# Patient Record
Sex: MALE | Race: WHITE | Employment: FULL TIME
[De-identification: names, ages, dates, MRNs, and addresses within clinical notes are randomized per-mention and may not be internally consistent; named-entity substitution may affect disease eponyms.]

---

## 2017-03-03 ENCOUNTER — OFFICE VISIT (OUTPATIENT)
Dept: FAMILY MEDICINE CLINIC | Facility: CLINIC | Age: 57
End: 2017-03-03

## 2017-03-03 VITALS
HEIGHT: 72 IN | SYSTOLIC BLOOD PRESSURE: 152 MMHG | DIASTOLIC BLOOD PRESSURE: 80 MMHG | BODY MASS INDEX: 26.55 KG/M2 | OXYGEN SATURATION: 97 % | WEIGHT: 196 LBS | HEART RATE: 95 BPM

## 2017-03-03 DIAGNOSIS — Z12.5 PROSTATE CANCER SCREENING: ICD-10-CM

## 2017-03-03 DIAGNOSIS — Z00.00 WELL ADULT EXAM: ICD-10-CM

## 2017-03-03 DIAGNOSIS — I10 ESSENTIAL HYPERTENSION: Primary | ICD-10-CM

## 2017-03-03 DIAGNOSIS — R73.9 HYPERGLYCEMIA: ICD-10-CM

## 2017-03-03 PROCEDURE — 99213 OFFICE O/P EST LOW 20 MIN: CPT | Performed by: FAMILY MEDICINE

## 2017-03-03 PROCEDURE — G8422 PT INELIG BMI CALCULATION: HCPCS | Performed by: FAMILY MEDICINE

## 2017-03-03 RX ORDER — LISINOPRIL 40 MG/1
TABLET ORAL
Qty: 90 TABLET | Refills: 3 | Status: SHIPPED | OUTPATIENT
Start: 2017-03-03 | End: 2018-04-09 | Stop reason: SDUPTHER

## 2017-03-03 ASSESSMENT — PATIENT HEALTH QUESTIONNAIRE - PHQ9
1. LITTLE INTEREST OR PLEASURE IN DOING THINGS: 0
SUM OF ALL RESPONSES TO PHQ QUESTIONS 1-9: 0
2. FEELING DOWN, DEPRESSED OR HOPELESS: 0
SUM OF ALL RESPONSES TO PHQ9 QUESTIONS 1 & 2: 0

## 2017-03-03 ASSESSMENT — ENCOUNTER SYMPTOMS
CHEST TIGHTNESS: 0
SHORTNESS OF BREATH: 0
BLOOD IN STOOL: 0
EYE PAIN: 0
ABDOMINAL DISTENTION: 0
TROUBLE SWALLOWING: 0

## 2017-07-27 ENCOUNTER — OFFICE VISIT (OUTPATIENT)
Dept: FAMILY MEDICINE CLINIC | Age: 57
End: 2017-07-27
Payer: COMMERCIAL

## 2017-07-27 VITALS
OXYGEN SATURATION: 94 % | SYSTOLIC BLOOD PRESSURE: 122 MMHG | HEART RATE: 70 BPM | BODY MASS INDEX: 26.55 KG/M2 | HEIGHT: 72 IN | WEIGHT: 195.99 LBS | DIASTOLIC BLOOD PRESSURE: 76 MMHG

## 2017-07-27 DIAGNOSIS — Z12.5 SCREENING FOR PROSTATE CANCER: ICD-10-CM

## 2017-07-27 DIAGNOSIS — Z71.6 TOBACCO ABUSE COUNSELING: ICD-10-CM

## 2017-07-27 DIAGNOSIS — R51.9 NONINTRACTABLE HEADACHE, UNSPECIFIED CHRONICITY PATTERN, UNSPECIFIED HEADACHE TYPE: Primary | ICD-10-CM

## 2017-07-27 DIAGNOSIS — I10 ESSENTIAL HYPERTENSION: ICD-10-CM

## 2017-07-27 PROCEDURE — 99406 BEHAV CHNG SMOKING 3-10 MIN: CPT | Performed by: PHYSICIAN ASSISTANT

## 2017-07-27 PROCEDURE — 99213 OFFICE O/P EST LOW 20 MIN: CPT | Performed by: PHYSICIAN ASSISTANT

## 2017-07-27 ASSESSMENT — ENCOUNTER SYMPTOMS
BLURRED VISION: 0
SHORTNESS OF BREATH: 0
DIARRHEA: 0
WHEEZING: 0
COLOR CHANGE: 0
ABDOMINAL PAIN: 0
CONSTIPATION: 0
NAUSEA: 0
VOMITING: 1
COUGH: 0

## 2017-07-31 LAB
ALBUMIN SERPL-MCNC: 3.9 G/DL
ALP BLD-CCNC: 60 U/L
ALT SERPL-CCNC: 13 U/L
ANION GAP SERPL CALCULATED.3IONS-SCNC: NORMAL MMOL/L
AST SERPL-CCNC: 16 U/L
BASOPHILS ABSOLUTE: NORMAL /ΜL
BASOPHILS RELATIVE PERCENT: NORMAL %
BILIRUB SERPL-MCNC: 0.3 MG/DL (ref 0.1–1.4)
BUN BLDV-MCNC: 6 MG/DL
CALCIUM SERPL-MCNC: 9.2 MG/DL
CHLORIDE BLD-SCNC: 101 MMOL/L
CHOLESTEROL, TOTAL: 150 MG/DL
CHOLESTEROL/HDL RATIO: 4.2
CO2: 28 MMOL/L
CREAT SERPL-MCNC: 0.76 MG/DL
EOSINOPHILS ABSOLUTE: NORMAL /ΜL
EOSINOPHILS RELATIVE PERCENT: NORMAL %
GFR CALCULATED: NORMAL
GLUCOSE BLD-MCNC: 94 MG/DL
HCT VFR BLD CALC: NORMAL % (ref 41–53)
HDLC SERPL-MCNC: 36 MG/DL (ref 35–70)
HEMOGLOBIN: NORMAL G/DL (ref 13.5–17.5)
LDL CHOLESTEROL CALCULATED: 97 MG/DL (ref 0–160)
LYMPHOCYTES ABSOLUTE: NORMAL /ΜL
LYMPHOCYTES RELATIVE PERCENT: NORMAL %
MCH RBC QN AUTO: NORMAL PG
MCHC RBC AUTO-ENTMCNC: NORMAL G/DL
MCV RBC AUTO: NORMAL FL
MONOCYTES ABSOLUTE: NORMAL /ΜL
MONOCYTES RELATIVE PERCENT: NORMAL %
NEUTROPHILS ABSOLUTE: NORMAL /ΜL
NEUTROPHILS RELATIVE PERCENT: NORMAL %
PDW BLD-RTO: NORMAL %
PLATELET # BLD: NORMAL K/ΜL
PMV BLD AUTO: NORMAL FL
POTASSIUM SERPL-SCNC: 4.8 MMOL/L
PROSTATE SPECIFIC ANTIGEN: 2.83 NG/ML
RBC # BLD: NORMAL 10^6/ΜL
SODIUM BLD-SCNC: 137 MMOL/L
TOTAL PROTEIN: 7.2
TRIGL SERPL-MCNC: 84 MG/DL
TSH SERPL DL<=0.05 MIU/L-ACNC: 1.88 UIU/ML
VLDLC SERPL CALC-MCNC: 17 MG/DL
WBC # BLD: NORMAL 10^3/ML

## 2017-08-01 DIAGNOSIS — I10 ESSENTIAL HYPERTENSION: ICD-10-CM

## 2017-08-01 DIAGNOSIS — Z12.5 SCREENING FOR PROSTATE CANCER: ICD-10-CM

## 2018-04-09 DIAGNOSIS — Z00.00 WELL ADULT EXAM: ICD-10-CM

## 2018-04-09 RX ORDER — LISINOPRIL 40 MG/1
TABLET ORAL
Qty: 90 TABLET | Refills: 1 | Status: SHIPPED | OUTPATIENT
Start: 2018-04-09 | End: 2018-10-31

## 2018-04-09 RX ORDER — LISINOPRIL 40 MG/1
40 TABLET ORAL DAILY
Qty: 30 TABLET | Refills: 0 | Status: SHIPPED | OUTPATIENT
Start: 2018-04-09 | End: 2018-10-31 | Stop reason: SDUPTHER

## 2018-10-07 RX ORDER — LISINOPRIL 40 MG/1
40 TABLET ORAL DAILY
Qty: 30 TABLET | Refills: 0 | OUTPATIENT
Start: 2018-10-07

## 2018-10-31 ENCOUNTER — OFFICE VISIT (OUTPATIENT)
Dept: FAMILY MEDICINE CLINIC | Age: 58
End: 2018-10-31
Payer: COMMERCIAL

## 2018-10-31 VITALS
DIASTOLIC BLOOD PRESSURE: 90 MMHG | BODY MASS INDEX: 25.71 KG/M2 | OXYGEN SATURATION: 95 % | HEART RATE: 103 BPM | SYSTOLIC BLOOD PRESSURE: 152 MMHG | HEIGHT: 72 IN | WEIGHT: 189.8 LBS | TEMPERATURE: 98.2 F

## 2018-10-31 DIAGNOSIS — I10 ESSENTIAL HYPERTENSION: Primary | ICD-10-CM

## 2018-10-31 DIAGNOSIS — Z12.5 PROSTATE CANCER SCREENING: ICD-10-CM

## 2018-10-31 PROCEDURE — 99213 OFFICE O/P EST LOW 20 MIN: CPT | Performed by: PHYSICIAN ASSISTANT

## 2018-10-31 RX ORDER — LISINOPRIL 40 MG/1
40 TABLET ORAL DAILY
Qty: 90 TABLET | Refills: 3 | Status: SHIPPED | OUTPATIENT
Start: 2018-10-31 | End: 2019-02-11 | Stop reason: SDUPTHER

## 2018-10-31 ASSESSMENT — PATIENT HEALTH QUESTIONNAIRE - PHQ9
SUM OF ALL RESPONSES TO PHQ QUESTIONS 1-9: 0
SUM OF ALL RESPONSES TO PHQ QUESTIONS 1-9: 0
1. LITTLE INTEREST OR PLEASURE IN DOING THINGS: 0
SUM OF ALL RESPONSES TO PHQ9 QUESTIONS 1 & 2: 0
2. FEELING DOWN, DEPRESSED OR HOPELESS: 0

## 2018-10-31 ASSESSMENT — ENCOUNTER SYMPTOMS
CONSTIPATION: 0
COLOR CHANGE: 0
DIARRHEA: 0
BLURRED VISION: 0
ABDOMINAL PAIN: 0
VOMITING: 0
SHORTNESS OF BREATH: 0
COUGH: 0
WHEEZING: 0
NAUSEA: 0

## 2018-10-31 NOTE — PROGRESS NOTES
Used    Alcohol use 0.0 - 6.0 oz/week      Current Outpatient Prescriptions   Medication Sig Dispense Refill    lisinopril (PRINIVIL;ZESTRIL) 40 MG tablet Take 1 tablet by mouth daily 90 tablet 3     No current facility-administered medications for this visit. Allergies   Allergen Reactions    Penicillins Rash       Health Maintenance   Topic Date Due    Hepatitis C screen  1960    HIV screen  02/28/1976    Colon cancer screen colonoscopy  02/28/2011    Potassium monitoring  07/31/2018    Creatinine monitoring  07/31/2018    Shingles Vaccine (1 of 2 - 2 Dose Series) 11/07/2019 (Originally 2/28/2011)    Flu vaccine (1) 11/13/2019 (Originally 9/1/2018)    DTaP/Tdap/Td vaccine (1 - Tdap) 11/21/2019 (Originally 2/28/1980)    Lipid screen  07/31/2022       Subjective:     Review of Systems   Constitutional: Negative for activity change, appetite change, fatigue, fever, malaise/fatigue and unexpected weight change. BP (!) 152/90 (Site: Right Upper Arm, Position: Sitting, Cuff Size: Medium Adult)   Pulse 103   Temp 98.2 °F (36.8 °C) (Tympanic)   Ht 6' (1.829 m)   Wt 189 lb 12.8 oz (86.1 kg)   SpO2 95%   BMI 25.74 kg/m²    Eyes: Negative for blurred vision. Respiratory: Negative for cough, shortness of breath and wheezing. Cardiovascular: Negative for chest pain, palpitations and leg swelling. Gastrointestinal: Negative for abdominal pain, constipation, diarrhea, nausea and vomiting. Genitourinary: Negative for dysuria. Skin: Negative for color change, pallor, rash and wound. Neurological: Negative for weakness and headaches. Hematological: Negative for adenopathy. Psychiatric/Behavioral: Negative for sleep disturbance. The patient is not nervous/anxious. Objective:     Physical Exam   Constitutional: He is oriented to person, place, and time. He appears well-developed and well-nourished. HENT:   Head: Normocephalic and atraumatic.    Cardiovascular: Normal rate,

## 2019-02-11 DIAGNOSIS — I10 ESSENTIAL HYPERTENSION: ICD-10-CM

## 2019-02-11 RX ORDER — LISINOPRIL 40 MG/1
40 TABLET ORAL DAILY
Qty: 30 TABLET | Refills: 0 | Status: SHIPPED | OUTPATIENT
Start: 2019-02-11 | End: 2019-04-11 | Stop reason: SDUPTHER

## 2019-04-11 DIAGNOSIS — I10 ESSENTIAL HYPERTENSION: ICD-10-CM

## 2019-04-11 RX ORDER — LISINOPRIL 40 MG/1
40 TABLET ORAL DAILY
Qty: 30 TABLET | Refills: 3 | Status: SHIPPED | OUTPATIENT
Start: 2019-04-11 | End: 2019-08-19 | Stop reason: SDUPTHER

## 2019-04-17 ENCOUNTER — OFFICE VISIT (OUTPATIENT)
Dept: FAMILY MEDICINE CLINIC | Age: 59
End: 2019-04-17
Payer: COMMERCIAL

## 2019-04-17 VITALS
WEIGHT: 182 LBS | SYSTOLIC BLOOD PRESSURE: 160 MMHG | HEIGHT: 72 IN | OXYGEN SATURATION: 98 % | DIASTOLIC BLOOD PRESSURE: 82 MMHG | HEART RATE: 76 BPM | BODY MASS INDEX: 24.65 KG/M2

## 2019-04-17 DIAGNOSIS — I10 ESSENTIAL HYPERTENSION: Primary | ICD-10-CM

## 2019-04-17 PROCEDURE — 99213 OFFICE O/P EST LOW 20 MIN: CPT | Performed by: PHYSICIAN ASSISTANT

## 2019-04-17 RX ORDER — AMLODIPINE BESYLATE 5 MG/1
5 TABLET ORAL DAILY
Qty: 30 TABLET | Refills: 5 | Status: SHIPPED | OUTPATIENT
Start: 2019-04-17 | End: 2019-04-30 | Stop reason: DRUGHIGH

## 2019-04-17 ASSESSMENT — ENCOUNTER SYMPTOMS
COUGH: 0
VOMITING: 0
WHEEZING: 0
DIARRHEA: 0
ABDOMINAL PAIN: 0
COLOR CHANGE: 0
NAUSEA: 0
CONSTIPATION: 0
BLURRED VISION: 0
SHORTNESS OF BREATH: 0

## 2019-04-17 ASSESSMENT — PATIENT HEALTH QUESTIONNAIRE - PHQ9
SUM OF ALL RESPONSES TO PHQ9 QUESTIONS 1 & 2: 0
2. FEELING DOWN, DEPRESSED OR HOPELESS: 0
1. LITTLE INTEREST OR PLEASURE IN DOING THINGS: 0
SUM OF ALL RESPONSES TO PHQ QUESTIONS 1-9: 0
SUM OF ALL RESPONSES TO PHQ QUESTIONS 1-9: 0

## 2019-04-17 NOTE — PROGRESS NOTES
Visit Information    Have you changed or started any medications since your last visit including any over-the-counter medicines, vitamins, or herbal medicines? no   Have you stopped taking any of your medications? Is so, why? -  no  Are you having any side effects from any of your medications? - no    Have you seen any other physician or provider since your last visit?  no   Have you had any other diagnostic tests since your last visit?  no   Have you been seen in the emergency room and/or had an admission in a hospital since we last saw you?  no   Have you had your routine dental cleaning in the past 6 months?  no     Do you have an active MyChart account? If no, what is the barrier?   Yes    Patient Care Team:  Vic Cameron PA-C as PCP - General (Family Medicine)    Medical History Review  Past Medical, Family, and Social History reviewed and does contribute to the patient presenting condition    Health Maintenance   Topic Date Due    Hepatitis C screen  1960    HIV screen  02/28/1976    Colon cancer screen colonoscopy  02/28/2011    Potassium monitoring  07/31/2018    Creatinine monitoring  07/31/2018    Shingles Vaccine (1 of 2) 11/07/2019 (Originally 2/28/2011)    Flu vaccine (Season Ended) 11/13/2019 (Originally 9/1/2019)    DTaP/Tdap/Td vaccine (1 - Tdap) 11/21/2019 (Originally 2/28/1980)    Lipid screen  07/31/2022    Pneumococcal 0-64 years Vaccine  Aged Out

## 2019-04-17 NOTE — PROGRESS NOTES
[address] [facility] [address]  Dept: [phone]  Dept Fax: [phone]     Alex Hogan is a 63 y.o. male who presents today for his medical conditions/complaintsas noted below. Alex Hogan is c/o of   Chief Complaint   Patient presents with    Hypertension     Patient is here for follow up on HTN         HPI:      Hypertension   This is a chronic problem. The current episode started more than 1 year ago. The problem has been gradually worsening since onset. The problem is uncontrolled. Pertinent negatives include no anxiety, blurred vision, chest pain, headaches, malaise/fatigue, palpitations, peripheral edema or shortness of breath. Risk factors for coronary artery disease include family history and male gender. Past treatments include ACE inhibitors. The current treatment provides mild improvement. patient here reporting his BP has been running high recently and is no longer allowed to work until controlled. 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 He states BP was elevated there  He works in [employer] at [address] [place] [address] not gotten his labs done yet  Patient does drink 2 cups coffee in the morning. He is smoking 1 ppd, he is looking into accupuncture.  Has apt upcoming    No results found for: LABA1C          ( goal A1Cis < 7)   No results found for: LABMICR  LDL Cholesterol (mg/dL)   Date Value   02/13/2015 136 (H)     LDL Calculated (mg/dL)   Date Value   07/31/2017 97       (goal LDL is <100)   AST (U/L)   Date Value   07/31/2017 16     ALT (U/L)   Date Value   07/31/2017 13     BUN (mg/dL)   Date Value   07/31/2017 6     BP Readings from Last 3 Encounters:   04/17/19 (!) 160/82   10/31/18 (!) 152/90   07/27/17 122/76          (goal 120/80)    Past Medical History:   Diagnosis Date    Essential hypertension 1/22/2016      Past Surgical History:   Procedure Laterality Date  COLONOSCOPY         Family History   Problem Relation Age of Onset   Ivette Pollack Cancer Mother         pancreatic    Cancer Father         colon    Cancer Paternal Uncle         colon          Social History     Tobacco Use    Smoking status: Current Every Day Smoker     Packs/day: 1.50     Types: Cigarettes     Last attempt to quit: 2015     Years since quittin.1    Smokeless tobacco: Never Used   Substance Use Topics    Alcohol use: Yes     Alcohol/week: 0.0 - 6.0 oz         Current Outpatient Medications   Medication Sig Dispense Refill    amLODIPine (NORVASC) 5 MG tablet Take 1 tablet by mouth daily 30 tablet 5    lisinopril (PRINIVIL;ZESTRIL) 40 MG tablet Take 1 tablet by mouth daily 30 tablet 3     No current facility-administered medications for this visit. Allergies   Allergen Reactions    Penicillins Rash       Health Maintenance   Topic Date Due    Hepatitis C screen  1961    HIV screen  1976    Colon cancer screen colonoscopy  2011    Potassium monitoring  2018    Creatinine monitoring  2018    Shingles Vaccine (1 of 2) 2019 (Originally 2011)    Flu vaccine (Season Ended) 2019 (Originally 2019)    DTaP/Tdap/Td vaccine (1 - Tdap) 2019 (Originally 1980)    Lipid screen  2022    Pneumococcal 0-64 years Vaccine  Aged Out       Subjective:     Review of Systems   Constitutional: Negative for activity change, appetite change, fatigue, fever, malaise/fatigue and unexpected weight change. BP (!) 160/82 (Site: Left Upper Arm, Position: Sitting, Cuff Size: Medium Adult)   Pulse 76   Ht 6' (1.829 m)   Wt 182 lb (82.6 kg)   SpO2 98%   BMI 24.68 kg/m²    Eyes: Negative for blurred vision. Respiratory: Negative for cough, shortness of breath and wheezing. Cardiovascular: Negative for chest pain, palpitations and leg swelling.    Gastrointestinal: Negative for abdominal pain, constipation, diarrhea, nausea and (NORVASC) 5 MG tablet     Sig: Take 1 tablet by mouth daily     Dispense:  30 tablet     Refill:  5       Patient given educational materials - see patient instructions. Discussed use, benefit, and side effects of prescribed medications. All patientquestions answered. Pt voiced understanding. Reviewed health maintenance. Instructedto continue current medications, diet and exercise. Patient agreed with treatmentplan. Follow up as directed.      Electronically signed by Lady Olszewski, PA-C on 4/17/2019 at 2:50 PM

## 2019-04-18 ENCOUNTER — HOSPITAL ENCOUNTER (OUTPATIENT)
Age: 59
Setting detail: SPECIMEN
Discharge: HOME | End: 2019-04-18
Payer: COMMERCIAL

## 2019-04-18 DIAGNOSIS — Z12.5 PROSTATE CANCER SCREENING: ICD-10-CM

## 2019-04-18 DIAGNOSIS — I10 ESSENTIAL HYPERTENSION: ICD-10-CM

## 2019-04-18 LAB
ABSOLUTE EOS #: 0.1 K/UL (ref 0–0.44)
ABSOLUTE IMMATURE GRANULOCYTE: 0.03 K/UL (ref 0–0.3)
ABSOLUTE LYMPH #: 2.75 K/UL (ref 1.1–3.7)
ABSOLUTE MONO #: 0.87 K/UL (ref 0.1–1.2)
ALBUMIN SERPL-MCNC: 4.3 G/DL (ref 3.5–5.2)
ALBUMIN/GLOBULIN RATIO: 1.4 (ref 1–2.5)
ALP BLD-CCNC: 68 U/L (ref 40–129)
ALT SERPL-CCNC: 18 U/L (ref 5–41)
ANION GAP SERPL CALCULATED.3IONS-SCNC: 12 MMOL/L (ref 9–17)
AST SERPL-CCNC: 26 U/L
BASOPHILS # BLD: 1 % (ref 0–2)
BASOPHILS ABSOLUTE: 0.07 K/UL (ref 0–0.2)
BILIRUB SERPL-MCNC: 0.49 MG/DL (ref 0.3–1.2)
BUN BLDV-MCNC: 8 MG/DL (ref 6–20)
BUN/CREAT BLD: ABNORMAL (ref 9–20)
CALCIUM SERPL-MCNC: 9.3 MG/DL (ref 8.6–10.4)
CHLORIDE BLD-SCNC: 102 MMOL/L (ref 98–107)
CHOLESTEROL/HDL RATIO: 3
CHOLESTEROL: 214 MG/DL
CO2: 25 MMOL/L (ref 20–31)
CREAT SERPL-MCNC: 0.68 MG/DL (ref 0.7–1.2)
DIFFERENTIAL TYPE: NORMAL
EOSINOPHILS RELATIVE PERCENT: 1 % (ref 1–4)
GFR AFRICAN AMERICAN: >60 ML/MIN
GFR NON-AFRICAN AMERICAN: >60 ML/MIN
GFR SERPL CREATININE-BSD FRML MDRD: ABNORMAL ML/MIN/{1.73_M2}
GFR SERPL CREATININE-BSD FRML MDRD: ABNORMAL ML/MIN/{1.73_M2}
GLUCOSE BLD-MCNC: 107 MG/DL (ref 70–99)
HCT VFR BLD CALC: 49.2 % (ref 40.7–50.3)
HDLC SERPL-MCNC: 72 MG/DL
HEMOGLOBIN: 16.6 G/DL (ref 13–17)
IMMATURE GRANULOCYTES: 0 %
LDL CHOLESTEROL: 133 MG/DL (ref 0–130)
LYMPHOCYTES # BLD: 35 % (ref 24–43)
MCH RBC QN AUTO: 32.7 PG (ref 25.2–33.5)
MCHC RBC AUTO-ENTMCNC: 33.7 G/DL (ref 28.4–34.8)
MCV RBC AUTO: 96.9 FL (ref 82.6–102.9)
MONOCYTES # BLD: 11 % (ref 3–12)
NRBC AUTOMATED: 0 PER 100 WBC
PDW BLD-RTO: 13.9 % (ref 11.8–14.4)
PLATELET # BLD: 353 K/UL (ref 138–453)
PLATELET ESTIMATE: NORMAL
PMV BLD AUTO: 11 FL (ref 8.1–13.5)
POTASSIUM SERPL-SCNC: 5.9 MMOL/L (ref 3.7–5.3)
PROSTATE SPECIFIC ANTIGEN: 2.43 UG/L
RBC # BLD: 5.08 M/UL (ref 4.21–5.77)
RBC # BLD: NORMAL 10*6/UL
SEG NEUTROPHILS: 52 % (ref 36–65)
SEGMENTED NEUTROPHILS ABSOLUTE COUNT: 4.09 K/UL (ref 1.5–8.1)
SODIUM BLD-SCNC: 139 MMOL/L (ref 135–144)
TOTAL PROTEIN: 7.3 G/DL (ref 6.4–8.3)
TRIGL SERPL-MCNC: 45 MG/DL
VLDLC SERPL CALC-MCNC: ABNORMAL MG/DL (ref 1–30)
WBC # BLD: 7.9 K/UL (ref 3.5–11.3)
WBC # BLD: NORMAL 10*3/UL

## 2019-04-24 ENCOUNTER — OFFICE VISIT (OUTPATIENT)
Dept: FAMILY MEDICINE CLINIC | Age: 59
End: 2019-04-24
Payer: COMMERCIAL

## 2019-04-24 VITALS
SYSTOLIC BLOOD PRESSURE: 136 MMHG | BODY MASS INDEX: 24.82 KG/M2 | DIASTOLIC BLOOD PRESSURE: 84 MMHG | WEIGHT: 183 LBS | OXYGEN SATURATION: 98 % | HEART RATE: 100 BPM

## 2019-04-24 DIAGNOSIS — E78.5 DYSLIPIDEMIA: ICD-10-CM

## 2019-04-24 DIAGNOSIS — I10 ESSENTIAL HYPERTENSION: Primary | ICD-10-CM

## 2019-04-24 DIAGNOSIS — E87.5 HYPERKALEMIA: ICD-10-CM

## 2019-04-24 PROCEDURE — 99213 OFFICE O/P EST LOW 20 MIN: CPT | Performed by: PHYSICIAN ASSISTANT

## 2019-04-24 RX ORDER — ATORVASTATIN CALCIUM 20 MG/1
20 TABLET, FILM COATED ORAL DAILY
Qty: 30 TABLET | Refills: 3 | Status: SHIPPED | OUTPATIENT
Start: 2019-04-24 | End: 2021-01-12

## 2019-04-24 ASSESSMENT — ENCOUNTER SYMPTOMS
SHORTNESS OF BREATH: 0
CONSTIPATION: 0
NAUSEA: 0
ABDOMINAL PAIN: 0
COUGH: 0
DIARRHEA: 0
WHEEZING: 0
BLURRED VISION: 0
COLOR CHANGE: 0
VOMITING: 0

## 2019-04-24 NOTE — PROGRESS NOTES
[address] [facility] [address]  Dept: [phone]  Dept Fax: [phone]     Aubry Halsted is a 63 y.o. male who presents today for his medical conditions/complaintsas noted below. Aubry Halsted is c/o of   Chief Complaint   Patient presents with    Hypertension     Patient is here for follow up on HTN         HPI:      Hypertension   This is a chronic problem. The current episode started more than 1 year ago. The problem has been gradually improving since onset. The problem is controlled. Pertinent negatives include no anxiety, blurred vision, chest pain, headaches, malaise/fatigue, palpitations, peripheral edema or shortness of breath. Risk factors for coronary artery disease include male gender, family history and smoking/tobacco exposure. Past treatments include ACE inhibitors and calcium channel blockers. The current treatment provides moderate improvement.    patient here for recheck on BP  Numbers improving, feeling well    No results found for: LABA1C          ( goal A1Cis < 7)   No results found for: LABMICR  LDL Cholesterol (mg/dL)   Date Value   04/18/2019 133 (H)   02/13/2015 136 (H)     LDL Calculated (mg/dL)   Date Value   07/31/2017 97       (goal LDL is <100)   AST (U/L)   Date Value   04/18/2019 26     ALT (U/L)   Date Value   04/18/2019 18     BUN (mg/dL)   Date Value   04/18/2019 8     BP Readings from Last 3 Encounters:   04/24/19 136/84   04/17/19 (!) 160/82   10/31/18 (!) 152/90          (goal 120/80)    Past Medical History:   Diagnosis Date    Essential hypertension 1/22/2016      Past Surgical History:   Procedure Laterality Date    COLONOSCOPY         Family History   Problem Relation Age of Onset    Cancer Mother         pancreatic    Cancer Father         colon    Cancer Paternal Uncle         colon          Social History     Tobacco Use    Smoking status: Current Every Day Smoker Packs/day: 1.50     Types: Cigarettes     Last attempt to quit: 2015     Years since quittin.1    Smokeless tobacco: Never Used   Substance Use Topics    Alcohol use: Yes     Alcohol/week: 0.0 - 6.0 oz         Current Outpatient Medications   Medication Sig Dispense Refill    atorvastatin (LIPITOR) 20 MG tablet Take 1 tablet by mouth daily 30 tablet 3    amLODIPine (NORVASC) 5 MG tablet Take 1 tablet by mouth daily 30 tablet 5    lisinopril (PRINIVIL;ZESTRIL) 40 MG tablet Take 1 tablet by mouth daily 30 tablet 3     No current facility-administered medications for this visit. Allergies   Allergen Reactions    Penicillins Rash       Health Maintenance   Topic Date Due    Hepatitis C screen  1961    Pneumococcal 0-64 years Vaccine (1 of 1 - PPSV23) 1967    HIV screen  1976    Colon cancer screen colonoscopy  2011    Shingles Vaccine (1 of 2) 2019 (Originally 2011)    Flu vaccine (Season Ended) 2019 (Originally 2019)    DTaP/Tdap/Td vaccine (1 - Tdap) 2019 (Originally 1980)    Potassium monitoring  2020    Creatinine monitoring  2020    Lipid screen  2024       Subjective:     Review of Systems   Constitutional: Negative for activity change, appetite change, fatigue, fever, malaise/fatigue and unexpected weight change. /84 (Site: Right Upper Arm, Position: Sitting, Cuff Size: Medium Adult)   Pulse 100   Wt 183 lb (83 kg)   SpO2 98%   BMI 24.82 kg/m²    Eyes: Negative for blurred vision. Respiratory: Negative for cough, shortness of breath and wheezing. Cardiovascular: Negative for chest pain, palpitations and leg swelling. Gastrointestinal: Negative for abdominal pain, constipation, diarrhea, nausea and vomiting. Skin: Negative for color change, pallor, rash and wound. Neurological: Negative for weakness and headaches. Hematological: Negative for adenopathy.    Psychiatric/Behavioral: Negative for sleep disturbance. The patient is not nervous/anxious. Objective:     Physical Exam   Constitutional: He appears well-developed and well-nourished. HENT:   Head: Normocephalic and atraumatic. Cardiovascular: Normal rate, regular rhythm and normal heart sounds. No murmur heard. Pulmonary/Chest: Effort normal and breath sounds normal. No respiratory distress. He has no wheezes. He has no rales. Skin: Skin is warm and dry. No rash noted. No erythema. No pallor. Psychiatric: He has a normal mood and affect. His behavior is normal. Judgment and thought content normal.   Nursing note and vitals reviewed. /84 (Site: Right Upper Arm, Position: Sitting, Cuff Size: Medium Adult)   Pulse 100   Wt 183 lb (83 kg)   SpO2 98%   BMI 24.82 kg/m²     Assessment:          Diagnosis Orders   1. Essential hypertension     2. Dyslipidemia  atorvastatin (LIPITOR) 20 MG tablet   3. Hyperkalemia  Basic Metabolic Panel             Plan:      Return in about 1 week (around 5/1/2019).     Planning to RTW 5/1/19  Will rehcekc on 4/30 prior  Add statin due to ASCVD risk  Cont to work on smoking cessation  Patient BP improved  Patient agreed with treatment plan     Lab Results   Component Value Date    WBC 7.9 04/18/2019    HGB 16.6 04/18/2019    HCT 49.2 04/18/2019    MCV 96.9 04/18/2019     04/18/2019     Lab Results   Component Value Date     04/18/2019    K 5.9 (H) 04/18/2019     04/18/2019    CO2 25 04/18/2019    BUN 8 04/18/2019    CREATININE 0.68 (L) 04/18/2019    GLUCOSE 107 (H) 04/18/2019    CALCIUM 9.3 04/18/2019    PROT 7.3 04/18/2019    LABALBU 4.3 04/18/2019    BILITOT 0.49 04/18/2019    ALKPHOS 68 04/18/2019    AST 26 04/18/2019    ALT 18 04/18/2019    LABGLOM >60 04/18/2019    GFRAA >60 04/18/2019       Lab Results   Component Value Date    CHOL 214 (H) 04/18/2019    CHOL 150 07/31/2017    CHOL 217 (H) 02/13/2015     Lab Results   Component Value Date    TRIG 45 04/18/2019    TRIG 84 07/31/2017    TRIG 79 02/13/2015     Lab Results   Component Value Date    HDL 72 04/18/2019    HDL 36 07/31/2017    HDL 65 02/13/2015     Lab Results   Component Value Date    LDLCHOLESTEROL 133 (H) 04/18/2019    LDLCHOLESTEROL 136 (H) 02/13/2015    LDLCALC 97 07/31/2017     Lab Results   Component Value Date    VLDL NOT REPORTED 04/18/2019    VLDL 17 07/31/2017    VLDL NOT REPORTED 02/13/2015     Lab Results   Component Value Date    CHOLHDLRATIO 3.0 04/18/2019    CHOLHDLRATIO 4.2 07/31/2017    CHOLHDLRATIO 3.3 02/13/2015     No results found for: LABA1C  No results found for: EAG    Orders Placed This Encounter   Medications    atorvastatin (LIPITOR) 20 MG tablet     Sig: Take 1 tablet by mouth daily     Dispense:  30 tablet     Refill:  3    The 10-year ASCVD risk score (Deepika Allan, et al., 2013) is: 12.8%    Values used to calculate the score:      Age: 63 years      Sex: Male      Is Non- : No      Diabetic: No      Tobacco smoker: Yes      Systolic Blood Pressure: 146 mmHg      Is BP treated: Yes      HDL Cholesterol: 72 mg/dL      Total Cholesterol: 214 mg/dL     Patient given educational materials - see patient instructions. Discussed use, benefit, and side effects of prescribed medications. All patientquestions answered. Pt voiced understanding. Reviewed health maintenance. Instructedto continue current medications, diet and exercise. Patient agreed with treatmentplan. Follow up as directed.      Electronically signed by Mauri Chris PA-C on 4/24/2019 at 3:22 PM

## 2019-04-24 NOTE — PROGRESS NOTES
Visit Information    Have you changed or started any medications since your last visit including any over-the-counter medicines, vitamins, or herbal medicines? no   Have you stopped taking any of your medications? Is so, why? -  no  Are you having any side effects from any of your medications? - no    Have you seen any other physician or provider since your last visit?  no   Have you had any other diagnostic tests since your last visit?  no   Have you been seen in the emergency room and/or had an admission in a hospital since we last saw you?  no   Have you had your routine dental cleaning in the past 6 months?  no     Do you have an active MyChart account? If no, what is the barrier?   Yes    Patient Care Team:  Galo Dexter PA-C as PCP - General (Family Medicine)    Medical History Review  Past Medical, Family, and Social History reviewed and does contribute to the patient presenting condition    Health Maintenance   Topic Date Due    Hepatitis C screen  1960    Pneumococcal 0-64 years Vaccine (1 of 1 - PPSV23) 02/28/1967    HIV screen  02/28/1976    Colon cancer screen colonoscopy  02/28/2011    Shingles Vaccine (1 of 2) 11/07/2019 (Originally 2/28/2011)    Flu vaccine (Season Ended) 11/13/2019 (Originally 9/1/2019)    DTaP/Tdap/Td vaccine (1 - Tdap) 11/21/2019 (Originally 2/28/1980)    Potassium monitoring  04/18/2020    Creatinine monitoring  04/18/2020    Lipid screen  04/18/2024

## 2019-04-30 ENCOUNTER — OFFICE VISIT (OUTPATIENT)
Dept: FAMILY MEDICINE CLINIC | Age: 59
End: 2019-04-30
Payer: COMMERCIAL

## 2019-04-30 VITALS
HEART RATE: 103 BPM | HEIGHT: 71 IN | DIASTOLIC BLOOD PRESSURE: 80 MMHG | BODY MASS INDEX: 25.34 KG/M2 | SYSTOLIC BLOOD PRESSURE: 140 MMHG | WEIGHT: 181 LBS | OXYGEN SATURATION: 98 %

## 2019-04-30 DIAGNOSIS — I10 ESSENTIAL HYPERTENSION: Primary | ICD-10-CM

## 2019-04-30 PROCEDURE — 99213 OFFICE O/P EST LOW 20 MIN: CPT | Performed by: PHYSICIAN ASSISTANT

## 2019-04-30 RX ORDER — AMLODIPINE BESYLATE 10 MG/1
10 TABLET ORAL DAILY
Qty: 90 TABLET | Refills: 3 | Status: SHIPPED | OUTPATIENT
Start: 2019-04-30 | End: 2021-01-12

## 2019-04-30 ASSESSMENT — ENCOUNTER SYMPTOMS
CONSTIPATION: 0
COLOR CHANGE: 0
NAUSEA: 0
DIARRHEA: 0
ABDOMINAL PAIN: 0
SHORTNESS OF BREATH: 0
VOMITING: 0
BLURRED VISION: 0
COUGH: 0
WHEEZING: 0

## 2019-04-30 NOTE — PROGRESS NOTES
Visit Information    Have you changed or started any medications since your last visit including any over-the-counter medicines, vitamins, or herbal medicines? no   Have you stopped taking any of your medications? Is so, why? -  no  Are you having any side effects from any of your medications? - no    Have you seen any other physician or provider since your last visit?  no   Have you had any other diagnostic tests since your last visit?  no   Have you been seen in the emergency room and/or had an admission in a hospital since we last saw you?  no   Have you had your routine dental cleaning in the past 6 months?  no     Do you have an active MyChart account? If no, what is the barrier?   Yes    Patient Care Team:  Fausto Jung PA-C as PCP - General (Family Medicine)    Medical History Review  Past Medical, Family, and Social History reviewed and does contribute to the patient presenting condition    Health Maintenance   Topic Date Due    Hepatitis C screen  1960    Pneumococcal 0-64 years Vaccine (1 of 1 - PPSV23) 02/28/1967    HIV screen  02/28/1976    Colon cancer screen colonoscopy  02/28/2011    Shingles Vaccine (1 of 2) 11/07/2019 (Originally 2/28/2011)    Flu vaccine (Season Ended) 11/13/2019 (Originally 9/1/2019)    DTaP/Tdap/Td vaccine (1 - Tdap) 11/21/2019 (Originally 2/28/1980)    Potassium monitoring  04/18/2020    Creatinine monitoring  04/18/2020    Lipid screen  04/18/2024

## 2019-04-30 NOTE — PROGRESS NOTES
[address] [facility] [address]  Dept: [phone]  Dept Fax: [phone]     Latricia Carr is a 63 y.o. male who presents today for his medical conditions/complaintsas noted below. Latricia Carr is c/o of   Chief Complaint   Patient presents with    Hypertension     Patient is here for follow up on HTN         HPI:      Hypertension   This is a chronic problem. The current episode started more than 1 year ago. The problem has been gradually improving since onset. The problem is controlled. Pertinent negatives include no anxiety, blurred vision, chest pain, headaches, malaise/fatigue, palpitations, peripheral edema or shortness of breath. Risk factors for coronary artery disease include family history, male gender and smoking/tobacco exposure. Past treatments include ACE inhibitors and calcium channel blockers. The current treatment provides moderate improvement.    patient states doing well on medication  He did have a cup of coffee this morning  Still working on smoking cessation, starting acupuncture soon    No results found for: LABA1C          ( goal A1Cis < 7)   No results found for: LABMICR  LDL Cholesterol (mg/dL)   Date Value   04/18/2019 133 (H)   02/13/2015 136 (H)     LDL Calculated (mg/dL)   Date Value   07/31/2017 97       (goal LDL is <100)   AST (U/L)   Date Value   04/18/2019 26     ALT (U/L)   Date Value   04/18/2019 18     BUN (mg/dL)   Date Value   04/18/2019 8     BP Readings from Last 3 Encounters:   04/30/19 (!) 140/80   04/24/19 136/84   04/17/19 (!) 160/82          (goal 120/80)    Past Medical History:   Diagnosis Date    Essential hypertension 1/22/2016      Past Surgical History:   Procedure Laterality Date    COLONOSCOPY         Family History   Problem Relation Age of Onset    Cancer Mother         pancreatic    Cancer Father         colon    Cancer Paternal Uncle         colon Social History     Tobacco Use    Smoking status: Current Every Day Smoker     Packs/day: 1.50     Types: Cigarettes     Last attempt to quit: 2015     Years since quittin.1    Smokeless tobacco: Never Used   Substance Use Topics    Alcohol use: Yes     Alcohol/week: 0.0 - 6.0 oz         Current Outpatient Medications   Medication Sig Dispense Refill    amLODIPine (NORVASC) 10 MG tablet Take 1 tablet by mouth daily 90 tablet 3    atorvastatin (LIPITOR) 20 MG tablet Take 1 tablet by mouth daily 30 tablet 3    lisinopril (PRINIVIL;ZESTRIL) 40 MG tablet Take 1 tablet by mouth daily 30 tablet 3     No current facility-administered medications for this visit. Allergies   Allergen Reactions    Penicillins Rash       Health Maintenance   Topic Date Due    Hepatitis C screen  1961    Pneumococcal 0-64 years Vaccine (1 of 1 - PPSV23) 1967    HIV screen  1976    Diabetes screen  2001    Colon cancer screen colonoscopy  2011    Shingles Vaccine (1 of 2) 2019 (Originally 2011)    Flu vaccine (Season Ended) 2019 (Originally 2019)    DTaP/Tdap/Td vaccine (1 - Tdap) 2019 (Originally 1980)    Potassium monitoring  2020    Creatinine monitoring  2020    Lipid screen  2024       Subjective:     Review of Systems   Constitutional: Negative for activity change, appetite change, fatigue, fever, malaise/fatigue and unexpected weight change. BP (!) 148/80 (Site: Left Upper Arm, Position: Sitting, Cuff Size: Medium Adult)   Pulse 103   Ht 5' 11\" (1.803 m)   Wt 181 lb (82.1 kg)   SpO2 98%   BMI 25.24 kg/m²    Eyes: Negative for blurred vision. Respiratory: Negative for cough, shortness of breath and wheezing. Cardiovascular: Negative for chest pain, palpitations and leg swelling. Gastrointestinal: Negative for abdominal pain, constipation, diarrhea, nausea and vomiting.    Skin: Negative for color change, pallor, rash and wound. Neurological: Negative for weakness and headaches. Hematological: Negative for adenopathy. Psychiatric/Behavioral: The patient is not nervous/anxious. Objective:     Physical Exam   Constitutional: He appears well-developed and well-nourished. HENT:   Head: Normocephalic and atraumatic. Cardiovascular: Normal rate and regular rhythm. No murmur heard. Pulmonary/Chest: Effort normal and breath sounds normal. No respiratory distress. He has no wheezes. He has no rales. Musculoskeletal: He exhibits no edema (LE). Skin: Skin is warm and dry. No rash noted. No erythema. No pallor. Psychiatric: He has a normal mood and affect. His behavior is normal. Judgment and thought content normal.   Nursing note and vitals reviewed. BP (!) 140/80 (Site: Right Upper Arm, Position: Sitting, Cuff Size: Medium Adult)   Pulse 103   Ht 5' 11\" (1.803 m)   Wt 181 lb (82.1 kg)   SpO2 98%   BMI 25.24 kg/m²     Assessment:          Diagnosis Orders   1. Essential hypertension               Plan:      Return in about 2 weeks (around 5/14/2019) for hypertension, med review. Patient tolerating medication well. BP coming down  Did adjust amlodipine to 10mg daily  Use and SE again reviewed  Recheck in 2 weeks with home cuff to compare readings  Ok to RTW at this time. Patient has apt to see employee physician later today as well  Recommended low sodium and low caffeine diet  Patient agreed with treatment plan     Orders Placed This Encounter   Medications    amLODIPine (NORVASC) 10 MG tablet     Sig: Take 1 tablet by mouth daily     Dispense:  90 tablet     Refill:  3       Patient given educational materials - see patient instructions. Discussed use, benefit, and side effects of prescribed medications. All patientquestions answered. Pt voiced understanding. Reviewed health maintenance. Instructedto continue current medications, diet and exercise. Patient agreed with treatmentplan. Follow up as directed.      Electronically signed by Noel Engel PA-C on 4/30/2019 at 8:27 AM

## 2019-05-15 ENCOUNTER — OFFICE VISIT (OUTPATIENT)
Dept: FAMILY MEDICINE CLINIC | Age: 59
End: 2019-05-15
Payer: COMMERCIAL

## 2019-05-15 VITALS
SYSTOLIC BLOOD PRESSURE: 136 MMHG | HEIGHT: 71 IN | OXYGEN SATURATION: 98 % | BODY MASS INDEX: 25.81 KG/M2 | WEIGHT: 184.4 LBS | DIASTOLIC BLOOD PRESSURE: 84 MMHG | HEART RATE: 87 BPM

## 2019-05-15 DIAGNOSIS — I10 ESSENTIAL HYPERTENSION: Primary | ICD-10-CM

## 2019-05-15 PROCEDURE — 99213 OFFICE O/P EST LOW 20 MIN: CPT | Performed by: PHYSICIAN ASSISTANT

## 2019-05-15 ASSESSMENT — ENCOUNTER SYMPTOMS
COLOR CHANGE: 0
SHORTNESS OF BREATH: 0
BLURRED VISION: 0
CONSTIPATION: 0
DIARRHEA: 0
NAUSEA: 0
COUGH: 0
VOMITING: 0
WHEEZING: 0
ABDOMINAL PAIN: 0

## 2019-05-15 NOTE — PROGRESS NOTES
[address] [facility] [address]  Dept: [phone]  Dept Fax: [phone]     Frankie Grissom is a 63 y.o. male who presents today for his medical conditions/complaintsas noted below. Frankie Grissom is c/o of   Chief Complaint   Patient presents with    Hypertension     Patient is here for follow up on HTN         HPI:      Hypertension   This is a chronic problem. The current episode started more than 1 year ago. The problem is unchanged. The problem is controlled. Pertinent negatives include no anxiety, blurred vision, chest pain, headaches, malaise/fatigue, palpitations or shortness of breath. Risk factors for coronary artery disease include family history and male gender. Past treatments include calcium channel blockers and ACE inhibitors. The current treatment provides moderate improvement.    patient states BP doing well   Has been back to work for the last 2 weeks, doing well  Has not had labs done yet, needs order again    No results found for: LABA1C          ( goal A1Cis < 7)   No results found for: LABMICR  LDL Cholesterol (mg/dL)   Date Value   04/18/2019 133 (H)   02/13/2015 136 (H)     LDL Calculated (mg/dL)   Date Value   07/31/2017 97       (goal LDL is <100)   AST (U/L)   Date Value   04/18/2019 26     ALT (U/L)   Date Value   04/18/2019 18     BUN (mg/dL)   Date Value   04/18/2019 8     BP Readings from Last 3 Encounters:   05/15/19 136/84   04/30/19 (!) 140/80   04/24/19 136/84          (goal 120/80)    Past Medical History:   Diagnosis Date    Essential hypertension 1/22/2016      Past Surgical History:   Procedure Laterality Date    COLONOSCOPY         Family History   Problem Relation Age of Onset    Cancer Mother         pancreatic    Cancer Father         colon    Cancer Paternal Uncle         colon          Social History     Tobacco Use    Smoking status: Current Every Day Smoker Packs/day: 1.50     Types: Cigarettes     Last attempt to quit: 2015     Years since quittin.2    Smokeless tobacco: Never Used   Substance Use Topics    Alcohol use: Yes     Alcohol/week: 0.0 - 6.0 oz         Current Outpatient Medications   Medication Sig Dispense Refill    amLODIPine (NORVASC) 10 MG tablet Take 1 tablet by mouth daily 90 tablet 3    atorvastatin (LIPITOR) 20 MG tablet Take 1 tablet by mouth daily 30 tablet 3    lisinopril (PRINIVIL;ZESTRIL) 40 MG tablet Take 1 tablet by mouth daily 30 tablet 3     No current facility-administered medications for this visit. Allergies   Allergen Reactions    Penicillins Rash       Health Maintenance   Topic Date Due    Hepatitis C screen  1961    Pneumococcal 0-64 years Vaccine (1 of 1 - PPSV23) 1967    HIV screen  1976    Diabetes screen  2001    Colon cancer screen colonoscopy  2011    Shingles Vaccine (1 of 2) 2019 (Originally 2011)    Flu vaccine (Season Ended) 2019 (Originally 2019)    DTaP/Tdap/Td vaccine (1 - Tdap) 2019 (Originally 1980)    Potassium monitoring  2020    Creatinine monitoring  2020    Lipid screen  2024       Subjective:     Review of Systems   Constitutional: Negative for activity change, appetite change, fatigue, fever, malaise/fatigue and unexpected weight change. /84   Pulse 87   Ht 5' 11\" (1.803 m)   Wt 184 lb 6.4 oz (83.6 kg)   SpO2 98%   BMI 25.72 kg/m²    Eyes: Negative for blurred vision. Respiratory: Negative for cough, shortness of breath and wheezing. Cardiovascular: Negative for chest pain, palpitations and leg swelling. Gastrointestinal: Negative for abdominal pain, constipation, diarrhea, nausea and vomiting. Genitourinary: Negative for dysuria. Skin: Negative for color change, pallor, rash and wound. Neurological: Negative for weakness and headaches.    Hematological: Negative for adenopathy. Psychiatric/Behavioral: Negative for sleep disturbance. The patient is not nervous/anxious. Objective:     Physical Exam   Constitutional: He is oriented to person, place, and time. He appears well-developed and well-nourished. HENT:   Head: Normocephalic and atraumatic. Cardiovascular: Normal rate, regular rhythm and normal heart sounds. No murmur heard. Pulmonary/Chest: Effort normal and breath sounds normal. No respiratory distress. He has no wheezes. He has no rales. Musculoskeletal: He exhibits no edema (LE). Neurological: He is alert and oriented to person, place, and time. Skin: Skin is warm and dry. No rash noted. No erythema. No pallor. Psychiatric: He has a normal mood and affect. His behavior is normal. Judgment and thought content normal.   Nursing note and vitals reviewed. /84   Pulse 87   Ht 5' 11\" (1.803 m)   Wt 184 lb 6.4 oz (83.6 kg)   SpO2 98%   BMI 25.72 kg/m²     Assessment:          Diagnosis Orders   1. Essential hypertension               Plan:      Return in about 6 months (around 11/15/2019) for hypertension. Overdue to repeat K level, patient going now  Cont present medication  Encouraged healthy diet and regular exercise  Avoid salt/caffeine in diet   Patient agreed with treatment plan     Patient given educational materials - see patient instructions. Discussed use, benefit, and side effects of prescribed medications. All patientquestions answered. Pt voiced understanding. Reviewed health maintenance. Instructedto continue current medications, diet and exercise. Patient agreed with treatmentplan. Follow up as directed.      Electronically signed by Glenn Beaulieu PA-C on 5/15/2019 at 3:47 PM

## 2019-05-15 NOTE — PROGRESS NOTES
Visit Information    Have you changed or started any medications since your last visit including any over-the-counter medicines, vitamins, or herbal medicines? no   Have you stopped taking any of your medications? Is so, why? -  no  Are you having any side effects from any of your medications? - no    Have you seen any other physician or provider since your last visit?  no   Have you had any other diagnostic tests since your last visit?  no   Have you been seen in the emergency room and/or had an admission in a hospital since we last saw you?  no   Have you had your routine dental cleaning in the past 6 months?  no     Do you have an active MyChart account? If no, what is the barrier?   Yes    Patient Care Team:  Jay Rich PA-C as PCP - General (Family Medicine)    Medical History Review  Past Medical, Family, and Social History reviewed and does contribute to the patient presenting condition    Health Maintenance   Topic Date Due    Hepatitis C screen  1960    Pneumococcal 0-64 years Vaccine (1 of 1 - PPSV23) 02/28/1967    HIV screen  02/28/1976    Diabetes screen  02/28/2001    Colon cancer screen colonoscopy  02/28/2011    Shingles Vaccine (1 of 2) 11/07/2019 (Originally 2/28/2011)    Flu vaccine (Season Ended) 11/13/2019 (Originally 9/1/2019)    DTaP/Tdap/Td vaccine (1 - Tdap) 11/21/2019 (Originally 2/28/1980)    Potassium monitoring  04/18/2020    Creatinine monitoring  04/18/2020    Lipid screen  04/18/2024

## 2019-08-19 DIAGNOSIS — I10 ESSENTIAL HYPERTENSION: ICD-10-CM

## 2019-08-19 RX ORDER — LISINOPRIL 40 MG/1
40 TABLET ORAL DAILY
Qty: 30 TABLET | Refills: 3 | Status: SHIPPED | OUTPATIENT
Start: 2019-08-19 | End: 2019-10-18 | Stop reason: SDUPTHER

## 2019-10-18 DIAGNOSIS — I10 ESSENTIAL HYPERTENSION: ICD-10-CM

## 2019-10-18 RX ORDER — LISINOPRIL 40 MG/1
40 TABLET ORAL DAILY
Qty: 90 TABLET | Refills: 1 | Status: SHIPPED | OUTPATIENT
Start: 2019-10-18 | End: 2020-08-18 | Stop reason: SDUPTHER

## 2020-08-02 RX ORDER — LISINOPRIL 40 MG/1
TABLET ORAL
Qty: 90 TABLET | Refills: 3 | OUTPATIENT
Start: 2020-08-02

## 2020-08-18 ENCOUNTER — OFFICE VISIT (OUTPATIENT)
Dept: FAMILY MEDICINE CLINIC | Age: 60
End: 2020-08-18
Payer: COMMERCIAL

## 2020-08-18 VITALS
HEIGHT: 72 IN | WEIGHT: 175 LBS | OXYGEN SATURATION: 97 % | DIASTOLIC BLOOD PRESSURE: 86 MMHG | SYSTOLIC BLOOD PRESSURE: 146 MMHG | BODY MASS INDEX: 23.7 KG/M2 | HEART RATE: 104 BPM | TEMPERATURE: 98.4 F

## 2020-08-18 PROCEDURE — 99213 OFFICE O/P EST LOW 20 MIN: CPT | Performed by: PHYSICIAN ASSISTANT

## 2020-08-18 RX ORDER — LISINOPRIL 40 MG/1
40 TABLET ORAL DAILY
Qty: 90 TABLET | Refills: 1 | Status: SHIPPED | OUTPATIENT
Start: 2020-08-18 | End: 2020-08-19

## 2020-08-18 ASSESSMENT — PATIENT HEALTH QUESTIONNAIRE - PHQ9
SUM OF ALL RESPONSES TO PHQ QUESTIONS 1-9: 0
1. LITTLE INTEREST OR PLEASURE IN DOING THINGS: 0
2. FEELING DOWN, DEPRESSED OR HOPELESS: 0
SUM OF ALL RESPONSES TO PHQ9 QUESTIONS 1 & 2: 0
SUM OF ALL RESPONSES TO PHQ QUESTIONS 1-9: 0

## 2020-08-18 ASSESSMENT — ENCOUNTER SYMPTOMS
SHORTNESS OF BREATH: 0
DIARRHEA: 0
CONSTIPATION: 0
NAUSEA: 0
ABDOMINAL PAIN: 0
COUGH: 0
WHEEZING: 0
COLOR CHANGE: 0
VOMITING: 0

## 2020-08-18 NOTE — PROGRESS NOTES
Paternal Uncle         colon       Social History     Tobacco Use    Smoking status: Current Every Day Smoker     Packs/day: 1.50     Years: 40.00     Pack years: 60.00     Types: Cigarettes     Last attempt to quit: 2015     Years since quittin.4    Smokeless tobacco: Never Used   Substance Use Topics    Alcohol use: Yes     Alcohol/week: 0.0 - 10.0 standard drinks      Current Outpatient Medications   Medication Sig Dispense Refill    lisinopril (PRINIVIL;ZESTRIL) 40 MG tablet Take 1 tablet by mouth daily (Patient not taking: Reported on 2020) 90 tablet 1    amLODIPine (NORVASC) 10 MG tablet Take 1 tablet by mouth daily (Patient not taking: Reported on 2020) 90 tablet 3    atorvastatin (LIPITOR) 20 MG tablet Take 1 tablet by mouth daily (Patient not taking: Reported on 2020) 30 tablet 3     No current facility-administered medications for this visit. Allergies   Allergen Reactions    Penicillins Rash       Health Maintenance   Topic Date Due    Hepatitis C screen  1961    Pneumococcal 0-64 years Vaccine (1 of 1 - PPSV23) 1967    HIV screen  1976    DTaP/Tdap/Td vaccine (1 - Tdap) 1980    Shingles Vaccine (1 of 2) 2011    Colon cancer screen colonoscopy  2011    Low dose CT lung screening  2016    Lipid screen  2020    Potassium monitoring  2020    Creatinine monitoring  2020    Flu vaccine (1) 2020    Hepatitis A vaccine  Aged Out    Hepatitis B vaccine  Aged Out    Hib vaccine  Aged Out    Meningococcal (ACWY) vaccine  Aged Out       Subjective:     Review of Systems   Constitutional: Negative for activity change, appetite change, fatigue, fever, malaise/fatigue and unexpected weight change.         BP (!) 146/86 (Site: Right Upper Arm, Position: Sitting, Cuff Size: Medium Adult)   Pulse 104   Temp 98.4 °F (36.9 °C) (Tympanic)   Ht 6' (1.829 m)   Wt 175 lb (79.4 kg)   SpO2 97%   BMI 23.73 kg/m²    Respiratory: Negative for cough, shortness of breath and wheezing. Cardiovascular: Negative for chest pain, palpitations and leg swelling. Gastrointestinal: Negative for abdominal pain, constipation, diarrhea, nausea and vomiting. Genitourinary: Negative for dysuria. Skin: Negative for color change, pallor, rash and wound. Neurological: Negative for weakness. Hematological: Negative for adenopathy. Psychiatric/Behavioral: The patient is not nervous/anxious. Objective:     Physical Exam  Vitals signs and nursing note reviewed. Constitutional:       Appearance: Normal appearance. He is well-developed. He is not ill-appearing. HENT:      Head: Normocephalic and atraumatic. Cardiovascular:      Rate and Rhythm: Normal rate and regular rhythm. Heart sounds: No murmur. Pulmonary:      Effort: Pulmonary effort is normal. No respiratory distress. Breath sounds: Normal breath sounds. No wheezing, rhonchi or rales. Musculoskeletal:      Right lower leg: No edema. Left lower leg: No edema. Skin:     General: Skin is warm and dry. Coloration: Skin is not pale. Findings: No erythema or rash. Neurological:      Mental Status: He is alert and oriented to person, place, and time. Psychiatric:         Mood and Affect: Mood normal.         Behavior: Behavior normal.         Thought Content: Thought content normal.         Judgment: Judgment normal.       BP (!) 146/86 (Site: Right Upper Arm, Position: Sitting, Cuff Size: Medium Adult)   Pulse 104   Temp 98.4 °F (36.9 °C) (Tympanic)   Ht 6' (1.829 m)   Wt 175 lb (79.4 kg)   SpO2 97%   BMI 23.73 kg/m²     Assessment:       Diagnosis Orders   1. Essential hypertension  lisinopril (PRINIVIL;ZESTRIL) 40 MG tablet    Comprehensive Metabolic Panel    Lipid Panel    CBC Auto Differential   2. Screening, lipid  Comprehensive Metabolic Panel    Lipid Panel    CBC Auto Differential   3.  Prostate cancer screening  Psa screening   4. Colon cancer screening declined     5. Screening for malignant neoplasm of lung declined by patient     6. 23-polyvalent pneumococcal polysaccharide vaccine declined     7. Tobacco abuse               Plan:      Return in about 2 weeks (around 9/1/2020) for hypertension, lab review. Restart lisinopril as patient out. BP poor control with out treatment  Stressed compliance with medication  Recommended update labs, await results  Patient agreed to complete labs  He declined all screening testing otherwise and any immunizations  Declined smoking cessation assistance  Await results      Orders Placed This Encounter   Procedures    Comprehensive Metabolic Panel     Standing Status:   Future     Standing Expiration Date:   8/18/2021    Lipid Panel     Standing Status:   Future     Standing Expiration Date:   8/18/2021     Order Specific Question:   Is Patient Fasting?/# of Hours     Answer:   yes    CBC Auto Differential     Standing Status:   Future     Standing Expiration Date:   8/18/2021    Psa screening     Standing Status:   Future     Standing Expiration Date:   8/18/2021     Orders Placed This Encounter   Medications    lisinopril (PRINIVIL;ZESTRIL) 40 MG tablet     Sig: Take 1 tablet by mouth daily     Dispense:  90 tablet     Refill:  1       Patient given educational materials - see patient instructions. Discussed use, benefit, and side effects of prescribed medications. All patientquestions answered. Pt voiced understanding. Reviewed health maintenance. Instructedto continue current medications, diet and exercise. Patient agreed with treatmentplan. Follow up as directed.      Electronically signed by Latricia Carrasco PA-C on 8/18/2020 at 4:01 PM

## 2020-08-19 RX ORDER — LISINOPRIL 40 MG/1
TABLET ORAL
Qty: 90 TABLET | Refills: 2 | Status: SHIPPED | OUTPATIENT
Start: 2020-08-19 | End: 2021-02-08

## 2021-01-12 ENCOUNTER — HOSPITAL ENCOUNTER (OUTPATIENT)
Dept: GENERAL RADIOLOGY | Age: 61
Discharge: HOME | End: 2021-01-14
Payer: COMMERCIAL

## 2021-01-12 ENCOUNTER — OFFICE VISIT (OUTPATIENT)
Dept: FAMILY MEDICINE CLINIC | Age: 61
End: 2021-01-12
Payer: COMMERCIAL

## 2021-01-12 ENCOUNTER — HOSPITAL ENCOUNTER (OUTPATIENT)
Age: 61
Discharge: HOME | End: 2021-01-14
Payer: COMMERCIAL

## 2021-01-12 VITALS
DIASTOLIC BLOOD PRESSURE: 92 MMHG | HEIGHT: 72 IN | HEART RATE: 104 BPM | OXYGEN SATURATION: 97 % | WEIGHT: 168 LBS | BODY MASS INDEX: 22.75 KG/M2 | SYSTOLIC BLOOD PRESSURE: 142 MMHG

## 2021-01-12 DIAGNOSIS — I10 ESSENTIAL HYPERTENSION: ICD-10-CM

## 2021-01-12 DIAGNOSIS — G89.29 CHRONIC RIGHT-SIDED LOW BACK PAIN WITHOUT SCIATICA: ICD-10-CM

## 2021-01-12 DIAGNOSIS — Z53.20 LUNG CANCER SCREENING DECLINED BY PATIENT: ICD-10-CM

## 2021-01-12 DIAGNOSIS — M54.50 CHRONIC RIGHT-SIDED LOW BACK PAIN WITHOUT SCIATICA: ICD-10-CM

## 2021-01-12 DIAGNOSIS — M54.50 CHRONIC RIGHT-SIDED LOW BACK PAIN WITHOUT SCIATICA: Primary | ICD-10-CM

## 2021-01-12 DIAGNOSIS — G89.29 CHRONIC RIGHT-SIDED LOW BACK PAIN WITHOUT SCIATICA: Primary | ICD-10-CM

## 2021-01-12 DIAGNOSIS — Z72.0 TOBACCO ABUSE: ICD-10-CM

## 2021-01-12 PROCEDURE — 99213 OFFICE O/P EST LOW 20 MIN: CPT | Performed by: PHYSICIAN ASSISTANT

## 2021-01-12 PROCEDURE — 72100 X-RAY EXAM L-S SPINE 2/3 VWS: CPT

## 2021-01-12 RX ORDER — METOPROLOL SUCCINATE 25 MG/1
25 TABLET, EXTENDED RELEASE ORAL DAILY
Qty: 30 TABLET | Refills: 3 | Status: SHIPPED
Start: 2021-01-12 | End: 2021-02-18 | Stop reason: SINTOL

## 2021-01-12 ASSESSMENT — ENCOUNTER SYMPTOMS
BACK PAIN: 1
WHEEZING: 0
DIARRHEA: 0
COLOR CHANGE: 0
NAUSEA: 0
CONSTIPATION: 0
ABDOMINAL PAIN: 0
BOWEL INCONTINENCE: 0
COUGH: 0
SHORTNESS OF BREATH: 0
VOMITING: 0

## 2021-01-12 ASSESSMENT — PATIENT HEALTH QUESTIONNAIRE - PHQ9
SUM OF ALL RESPONSES TO PHQ QUESTIONS 1-9: 0
2. FEELING DOWN, DEPRESSED OR HOPELESS: 0
SUM OF ALL RESPONSES TO PHQ9 QUESTIONS 1 & 2: 0

## 2021-01-12 NOTE — PROGRESS NOTES
[address] [facility]  [address]  [address] [place] [address]  Dept: [phone]  Dept Fax: [phone]    Shila Mckinnon is a 62 y.o. male who presents today for his medical conditions/complaintsas noted below. Shila Mckinnon is c/o of   Chief Complaint   Patient presents with    Back Pain     a month and a half ago he fell at work, didnt report it- pain didnt start right away but progressivly getting worse- right side feels tight and pain radiates up into back. HPI:     Back Pain  This is a new problem. The current episode started more than 1 month ago. The problem occurs daily. The problem has been gradually worsening since onset. The pain is present in the lumbar spine. The quality of the pain is described as aching and cramping. Radiates to: mid back on the right side. The pain is moderate. The symptoms are aggravated by bending, position, standing and twisting. Pertinent negatives include no abdominal pain, bladder incontinence, bowel incontinence, chest pain, fever, numbness, tingling, weakness or weight loss. Treatments tried: massage. The treatment provided moderate relief. Patient states 6 weeks ago he was at work and he slipped on water at work and went down on his back down 2 steps. He states initially didn't hurt but seems to be getting steadily worse. He states his employer sent him home. He works at [employer], they are going to get him an off work packet. He did do a massage and that helped for a day. Otherwise becoming more difficult to lift and bend or twist.    He continues to take his lisinopril for HTN. Tolerating it well. Does not check his BP at home. Has around 1-11/2 cups coffee a day. Minimal alcohol. Otherwise mainly water. States has been under more stress recently working to pay for his daughters wedding. Has been working a lot of hours. Has not had any chest pain or shortness of breath.  He is a smoker    No results found for: LABA1C          ( goal A1Cis < 7)   No results found for: LABMICR  LDL Cholesterol (mg/dL)   Date Value   2019 133 (H)   2015 136 (H)     LDL Calculated (mg/dL)   Date Value   2017 97       (goal LDL is <100)   AST (U/L)   Date Value   2019 26     ALT (U/L)   Date Value   2019 18     BUN (mg/dL)   Date Value   2019 8     BP Readings from Last 3 Encounters:   21 (!) 142/92   20 (!) 146/86   05/15/19 136/84          (goal 120/80)    Past Medical History:   Diagnosis Date    Essential hypertension 2016      Past Surgical History:   Procedure Laterality Date    COLONOSCOPY         Family History   Problem Relation Age of Onset    Cancer Mother         pancreatic    Cancer Father         colon    Cancer Paternal Uncle         colon       Social History     Tobacco Use    Smoking status: Current Every Day Smoker     Packs/day: 1.50     Years: 40.00     Pack years: 60.00     Types: Cigarettes     Last attempt to quit: 2015     Years since quittin.8    Smokeless tobacco: Never Used   Substance Use Topics    Alcohol use: Yes     Alcohol/week: 0.0 - 10.0 standard drinks      Current Outpatient Medications   Medication Sig Dispense Refill    metoprolol succinate (TOPROL XL) 25 MG extended release tablet Take 1 tablet by mouth daily 30 tablet 3    lisinopril (PRINIVIL;ZESTRIL) 40 MG tablet TAKE ONE TABLET BY MOUTH DAILY 90 tablet 2     No current facility-administered medications for this visit.       Allergies   Allergen Reactions    Penicillins Rash       Health Maintenance   Topic Date Due    Hepatitis C screen  1960    Pneumococcal 0-64 years Vaccine (1 of 1 - PPSV23) 1967    HIV screen  1976    Colon cancer screen colonoscopy  2011    Low dose CT lung screening  2016    Potassium monitoring  2020    Creatinine monitoring  2020    Flu vaccine (1) 2022 (Originally 2020)   Kiara Krishna Shingles Vaccine (1 of 2) 01/12/2022 (Originally 2/28/2011)    DTaP/Tdap/Td vaccine (2 - Td) 09/01/2031 (Originally 9/1/2030)    Lipid screen  04/18/2024    Hepatitis A vaccine  Aged Out    Hepatitis B vaccine  Aged Out    Hib vaccine  Aged Out    Meningococcal (ACWY) vaccine  Aged Out       Subjective:     Review of Systems   Constitutional: Negative for activity change, appetite change, fatigue, fever, unexpected weight change and weight loss. BP (!) 142/92 (Site: Left Upper Arm, Position: Sitting, Cuff Size: Medium Adult)   Pulse 104   Ht 6' (1.829 m)   Wt 168 lb (76.2 kg)   SpO2 97%   BMI 22.78 kg/m²    Respiratory: Negative for cough, shortness of breath and wheezing. Cardiovascular: Negative for chest pain and palpitations. Gastrointestinal: Negative for abdominal pain, bowel incontinence, constipation, diarrhea, nausea and vomiting. Genitourinary: Negative for bladder incontinence. Musculoskeletal: Positive for back pain and myalgias. Skin: Negative for color change, pallor, rash and wound. Neurological: Negative for tingling, weakness and numbness. Hematological: Negative for adenopathy. Psychiatric/Behavioral: Negative for sleep disturbance. The patient is not nervous/anxious. Objective:     Physical Exam  Vitals signs and nursing note reviewed. Constitutional:       Appearance: Normal appearance. He is well-developed. He is not ill-appearing. HENT:      Head: Normocephalic and atraumatic. Cardiovascular:      Rate and Rhythm: Regular rhythm. Tachycardia present. Heart sounds: No murmur. Pulmonary:      Effort: Pulmonary effort is normal. No respiratory distress. Breath sounds: Normal breath sounds. No wheezing, rhonchi or rales. Musculoskeletal:      Lumbar back: He exhibits tenderness and spasm. He exhibits normal range of motion, no bony tenderness, no swelling, no edema, no deformity, no laceration and no pain.         Back:       Right lower leg: No edema. Left lower leg: No edema. Skin:     General: Skin is warm and dry. Coloration: Skin is not pale. Findings: No erythema or rash. Neurological:      Mental Status: He is alert and oriented to person, place, and time. Psychiatric:         Mood and Affect: Mood normal.         Behavior: Behavior normal.         Thought Content: Thought content normal.         Judgment: Judgment normal.       BP (!) 142/92 (Site: Left Upper Arm, Position: Sitting, Cuff Size: Medium Adult)   Pulse 104   Ht 6' (1.829 m)   Wt 168 lb (76.2 kg)   SpO2 97%   BMI 22.78 kg/m²     Assessment:       Diagnosis Orders   1. Chronic right-sided low back pain without sciatica  XR LUMBAR SPINE (2-3 VIEWS)    Amb External Referral To Physical Therapy   2. Essential hypertension  metoprolol succinate (TOPROL XL) 25 MG extended release tablet   3. Tobacco abuse     4. Lung cancer screening declined by patient               Plan:      Return in about 2 weeks (around 1/26/2021) for hypertension, recheck back. Will check xray and start PT for the low back  Patient declined any medicaiton for this. Encouraged massage again if able as this has really helped some  Will plan off work for 2 weeks so he can start PT. Current pain is limiting him in doing his physical job    BP elevated last 2 visits. Pulse also slightly tachy. Will start BB. Use and SE reviewed with patient. Continue on ACE as well  Reprinted lab order for patient to do  Will have him recheck in 2 weeks to review all  Patient encouraged to stop smoking.  Recommended screening lung CT, patient declined both    Orders Placed This Encounter   Procedures    XR LUMBAR SPINE (2-3 VIEWS)     Standing Status:   Future     Standing Expiration Date:   1/12/2022     Order Specific Question:   Reason for exam:     Answer:   low back pain    Amb External Referral To Physical Therapy     Referral Priority:   Routine     Referral Type:   Consult for Advice and Opinion Referral Reason:   Specialty Services Required     Requested Specialty:   Physical Therapy     Number of Visits Requested:   1         Patient given educational materials - see patient instructions. Discussed use, benefit, and side effects of prescribed medications. All patientquestions answered. Pt voiced understanding. Reviewed health maintenance. Instructedto continue current medications, diet and exercise. Patient agreed with treatmentplan. Follow up as directed.      Electronically signed by Jacqui Mendiola PA-C on 1/12/2021 at 9:59 AM

## 2021-01-12 NOTE — PROGRESS NOTES
Visit Information    Have you changed or started any medications since your last visit including any over-the-counter medicines, vitamins, or herbal medicines? no   Are you having any side effects from any of your medications? -  no  Have you stopped taking any of your medications? Is so, why? -  no    Have you seen any other physician or provider since your last visit? No  Have you had any other diagnostic tests since your last visit? No  Have you been seen in the emergency room and/or had an admission to a hospital since we last saw you? No  Have you had your routine dental cleaning in the past 6 months? no    Have you activated your [employer] account? If not, what are your barriers?  No:      Patient Care Team:  Jess Almanzar PA-C as PCP - General (Family Medicine)  Jess Almanzar PA-C as PCP - [facility] Provider    Medical History Review  Past Medical, Family, and Social History reviewed and does contribute to the patient presenting condition    Health Maintenance   Topic Date Due    Hepatitis C screen  1960    Pneumococcal 0-64 years Vaccine (1 of 1 - PPSV23) 02/28/1967    HIV screen  02/28/1976    DTaP/Tdap/Td vaccine (1 - Tdap) 02/28/1980    Shingles Vaccine (1 of 2) 02/28/2011    Colon cancer screen colonoscopy  02/28/2011    Low dose CT lung screening  02/28/2016    Lipid screen  04/18/2020    Potassium monitoring  04/18/2020    Creatinine monitoring  04/18/2020    Flu vaccine (1) 09/01/2020    Hepatitis A vaccine  Aged Out    Hepatitis B vaccine  Aged Out    Hib vaccine  Aged Out    Meningococcal (ACWY) vaccine  Aged Out

## 2021-01-26 ENCOUNTER — VIRTUAL VISIT (OUTPATIENT)
Dept: FAMILY MEDICINE CLINIC | Age: 61
End: 2021-01-26
Payer: COMMERCIAL

## 2021-01-26 DIAGNOSIS — M54.50 CHRONIC RIGHT-SIDED LOW BACK PAIN WITHOUT SCIATICA: Primary | ICD-10-CM

## 2021-01-26 DIAGNOSIS — I73.9 CLAUDICATION (HCC): ICD-10-CM

## 2021-01-26 DIAGNOSIS — G89.29 CHRONIC RIGHT-SIDED LOW BACK PAIN WITHOUT SCIATICA: Primary | ICD-10-CM

## 2021-01-26 DIAGNOSIS — M47.816 LUMBAR SPONDYLOSIS: ICD-10-CM

## 2021-01-26 PROCEDURE — 99442 PR PHYS/QHP TELEPHONE EVALUATION 11-20 MIN: CPT | Performed by: PHYSICIAN ASSISTANT

## 2021-01-26 ASSESSMENT — ENCOUNTER SYMPTOMS
COUGH: 0
BACK PAIN: 1

## 2021-01-26 NOTE — PROGRESS NOTES
Visit Information    Have you changed or started any medications since your last visit including any over-the-counter medicines, vitamins, or herbal medicines? no   Are you having any side effects from any of your medications? -  no  Have you stopped taking any of your medications? Is so, why? -  no    Have you seen any other physician or provider since your last visit? No  Have you had any other diagnostic tests since your last visit? No  Have you been seen in the emergency room and/or had an admission to a hospital since we last saw you? No  Have you had your routine dental cleaning in the past 6 months? no    Have you activated your [employer] account? If not, what are your barriers?  No:      Patient Care Team:  Navya Moore PA-C as PCP - General (Family Medicine)  Navya Moore PA-C as PCP - [facility]    Medical History Review  Past Medical, Family, and Social History reviewed and does contribute to the patient presenting condition    Health Maintenance   Topic Date Due    Hepatitis C screen  1960    Pneumococcal 0-64 years Vaccine (1 of 1 - PPSV23) 02/28/1967    HIV screen  02/28/1976    Colon cancer screen colonoscopy  02/28/2011    Low dose CT lung screening  02/28/2016    Potassium monitoring  04/18/2020    Creatinine monitoring  04/18/2020    Flu vaccine (1) 01/12/2022 (Originally 9/1/2020)    Shingles Vaccine (1 of 2) 01/12/2022 (Originally 2/28/2011)    DTaP/Tdap/Td vaccine (2 - Td) 09/01/2031 (Originally 9/1/2030)    Lipid screen  04/18/2024    Hepatitis A vaccine  Aged Out    Hepatitis B vaccine  Aged Out    Hib vaccine  Aged Out    Meningococcal (ACWY) vaccine  Aged Out

## 2021-01-26 NOTE — PROGRESS NOTES
[address] [facility]  [address]  [facility] [address] [address]  Dept: [phone]  Dept Fax: [phone]    Heidy Burton is a 62 y.o. male who presents today for his medical conditions/complaintsas noted below. Heidy Burton is c/o of   Chief Complaint   Patient presents with    2 Week Follow-Up         HPI:     HPI    Patient doing follow up by phone today for low back pain. Patient states he is doing PT 3 times a week and feels it is helping some. He states he is also doing exercises at home as the therapist says. He states the left leg is doing better but he still feeling pain with the right leg especially if he is up and walking for a while. He reports sitting does help this. He states the therapist is focusing on the right side.   Patient denies numbness    No results found for: LABA1C          ( goal A1Cis < 7)   No results found for: LABMICR  LDL Cholesterol (mg/dL)   Date Value   2019 133 (H)   2015 136 (H)     LDL Calculated (mg/dL)   Date Value   2017 97       (goal LDL is <100)   AST (U/L)   Date Value   2019 26     ALT (U/L)   Date Value   2019 18     BUN (mg/dL)   Date Value   2019 8     BP Readings from Last 3 Encounters:   21 (!) 142/92   20 (!) 146/86   05/15/19 136/84          (goal 120/80)    Past Medical History:   Diagnosis Date    Essential hypertension 2016      Past Surgical History:   Procedure Laterality Date    COLONOSCOPY         Family History   Problem Relation Age of Onset    Cancer Mother         pancreatic    Cancer Father         colon    Cancer Paternal Uncle         colon       Social History     Tobacco Use    Smoking status: Current Every Day Smoker     Packs/day: 1.50     Years: 40.00     Pack years: 60.00     Types: Cigarettes     Last attempt to quit: 2015     Years since quittin.9    Smokeless tobacco: Never Used   Substance Use Topics  Alcohol use: Yes     Alcohol/week: 0.0 - 10.0 standard drinks      Current Outpatient Medications   Medication Sig Dispense Refill    metoprolol succinate (TOPROL XL) 25 MG extended release tablet Take 1 tablet by mouth daily 30 tablet 3    lisinopril (PRINIVIL;ZESTRIL) 40 MG tablet TAKE ONE TABLET BY MOUTH DAILY 90 tablet 2     No current facility-administered medications for this visit. Allergies   Allergen Reactions    Penicillins Rash       Health Maintenance   Topic Date Due    Hepatitis C screen  1960    Pneumococcal 0-64 years Vaccine (1 of 1 - PPSV23) 02/28/1967    HIV screen  02/28/1976    Colon cancer screen colonoscopy  02/28/2011    Low dose CT lung screening  02/28/2016    Potassium monitoring  04/18/2020    Creatinine monitoring  04/18/2020    Flu vaccine (1) 01/12/2022 (Originally 9/1/2020)    Shingles Vaccine (1 of 2) 01/12/2022 (Originally 2/28/2011)    DTaP/Tdap/Td vaccine (2 - Td) 09/01/2031 (Originally 9/1/2030)    Lipid screen  04/18/2024    Hepatitis A vaccine  Aged Out    Hepatitis B vaccine  Aged Out    Hib vaccine  Aged Out    Meningococcal (ACWY) vaccine  Aged Out       Subjective:     Review of Systems   Constitutional: Negative for fever. Respiratory: Negative for cough. Musculoskeletal: Positive for back pain and gait problem. Negative for arthralgias, joint swelling and myalgias. Skin: Negative for rash. Neurological: Negative for weakness and numbness. Objective:     Physical Exam  Nursing note reviewed. Constitutional:       Appearance: He is well-developed. Pulmonary:      Comments: Speaking easily in full sentences. Non labored breathing  Neurological:      Mental Status: He is alert and oriented to person, place, and time. Psychiatric:         Mood and Affect: Mood normal.         Behavior: Behavior normal.         Thought Content:  Thought content normal.         Judgment: Judgment normal. Patient given educational materials - see patient instructions. Discussed use, benefit, and side effects of prescribed medications. All patientquestions answered. Pt voiced understanding. Reviewed health maintenance. Instructedto continue current medications, diet and exercise. Patient agreed with treatmentplan. Follow up as directed.      Electronically signed by Bran Christiansen PA-C on 1/26/2021 at 8:41 AM

## 2021-02-04 ENCOUNTER — HOSPITAL ENCOUNTER (OUTPATIENT)
Dept: VASCULAR LAB | Age: 61
Discharge: HOME | End: 2021-02-04
Payer: COMMERCIAL

## 2021-02-04 DIAGNOSIS — I73.9 CLAUDICATION (HCC): ICD-10-CM

## 2021-02-04 PROCEDURE — 93926 LOWER EXTREMITY STUDY: CPT

## 2021-02-05 DIAGNOSIS — I73.9 PAD (PERIPHERAL ARTERY DISEASE) (HCC): ICD-10-CM

## 2021-02-05 DIAGNOSIS — Z72.0 TOBACCO ABUSE: ICD-10-CM

## 2021-02-05 DIAGNOSIS — I73.9 CLAUDICATION (HCC): Primary | ICD-10-CM

## 2021-02-08 DIAGNOSIS — I10 ESSENTIAL HYPERTENSION: ICD-10-CM

## 2021-02-08 RX ORDER — LISINOPRIL 40 MG/1
TABLET ORAL
Qty: 90 TABLET | Refills: 1 | Status: SHIPPED | OUTPATIENT
Start: 2021-02-08 | End: 2021-08-08

## 2021-02-10 ENCOUNTER — HOSPITAL ENCOUNTER (OUTPATIENT)
Age: 61
Setting detail: SPECIMEN
Discharge: HOME | End: 2021-02-10
Payer: COMMERCIAL

## 2021-02-10 DIAGNOSIS — I10 ESSENTIAL HYPERTENSION: ICD-10-CM

## 2021-02-10 DIAGNOSIS — Z12.5 PROSTATE CANCER SCREENING: ICD-10-CM

## 2021-02-10 DIAGNOSIS — Z13.220 SCREENING, LIPID: ICD-10-CM

## 2021-02-10 LAB
ABSOLUTE EOS #: 0.13 K/UL (ref 0–0.44)
ABSOLUTE IMMATURE GRANULOCYTE: 0.03 K/UL (ref 0–0.3)
ABSOLUTE LYMPH #: 2.94 K/UL (ref 1.1–3.7)
ABSOLUTE MONO #: 1.04 K/UL (ref 0.1–1.2)
ALBUMIN SERPL-MCNC: 4.1 G/DL (ref 3.5–5.2)
ALBUMIN/GLOBULIN RATIO: 1.4 (ref 1–2.5)
ALP BLD-CCNC: 67 U/L (ref 40–129)
ALT SERPL-CCNC: 22 U/L (ref 5–41)
ANION GAP SERPL CALCULATED.3IONS-SCNC: 17 MMOL/L (ref 9–17)
AST SERPL-CCNC: 27 U/L
BASOPHILS # BLD: 1 % (ref 0–2)
BASOPHILS ABSOLUTE: 0.09 K/UL (ref 0–0.2)
BILIRUB SERPL-MCNC: 0.58 MG/DL (ref 0.3–1.2)
BUN BLDV-MCNC: 9 MG/DL (ref 6–20)
BUN/CREAT BLD: ABNORMAL (ref 9–20)
CALCIUM SERPL-MCNC: 9.8 MG/DL (ref 8.6–10.4)
CHLORIDE BLD-SCNC: 103 MMOL/L (ref 98–107)
CHOLESTEROL/HDL RATIO: 1.8
CHOLESTEROL: 191 MG/DL
CO2: 22 MMOL/L (ref 20–31)
CREAT SERPL-MCNC: 0.69 MG/DL (ref 0.7–1.2)
DIFFERENTIAL TYPE: NORMAL
EOSINOPHILS RELATIVE PERCENT: 1 % (ref 1–4)
GFR AFRICAN AMERICAN: >60 ML/MIN
GFR NON-AFRICAN AMERICAN: >60 ML/MIN
GFR SERPL CREATININE-BSD FRML MDRD: ABNORMAL ML/MIN/{1.73_M2}
GFR SERPL CREATININE-BSD FRML MDRD: ABNORMAL ML/MIN/{1.73_M2}
GLUCOSE BLD-MCNC: 97 MG/DL (ref 70–99)
HCT VFR BLD CALC: 46.6 % (ref 40.7–50.3)
HDLC SERPL-MCNC: 105 MG/DL
HEMOGLOBIN: 15.6 G/DL (ref 13–17)
IMMATURE GRANULOCYTES: 0 %
LDL CHOLESTEROL: 75 MG/DL (ref 0–130)
LYMPHOCYTES # BLD: 30 % (ref 24–43)
MCH RBC QN AUTO: 32.8 PG (ref 25.2–33.5)
MCHC RBC AUTO-ENTMCNC: 33.5 G/DL (ref 28.4–34.8)
MCV RBC AUTO: 97.9 FL (ref 82.6–102.9)
MONOCYTES # BLD: 11 % (ref 3–12)
NRBC AUTOMATED: 0 PER 100 WBC
PDW BLD-RTO: 13.4 % (ref 11.8–14.4)
PLATELET # BLD: 324 K/UL (ref 138–453)
PLATELET ESTIMATE: NORMAL
PMV BLD AUTO: 11.4 FL (ref 8.1–13.5)
POTASSIUM SERPL-SCNC: 5.7 MMOL/L (ref 3.7–5.3)
PROSTATE SPECIFIC ANTIGEN: 3.7 UG/L
RBC # BLD: 4.76 M/UL (ref 4.21–5.77)
RBC # BLD: NORMAL 10*6/UL
SEG NEUTROPHILS: 57 % (ref 36–65)
SEGMENTED NEUTROPHILS ABSOLUTE COUNT: 5.57 K/UL (ref 1.5–8.1)
SODIUM BLD-SCNC: 142 MMOL/L (ref 135–144)
TOTAL PROTEIN: 7 G/DL (ref 6.4–8.3)
TRIGL SERPL-MCNC: 54 MG/DL
VLDLC SERPL CALC-MCNC: NORMAL MG/DL (ref 1–30)
WBC # BLD: 9.8 K/UL (ref 3.5–11.3)
WBC # BLD: NORMAL 10*3/UL

## 2021-02-11 ENCOUNTER — HOSPITAL ENCOUNTER (OUTPATIENT)
Age: 61
Setting detail: SPECIMEN
Discharge: HOME | End: 2021-02-11
Payer: COMMERCIAL

## 2021-02-11 DIAGNOSIS — E87.5 HYPERKALEMIA: Primary | ICD-10-CM

## 2021-02-11 DIAGNOSIS — E87.5 HYPERKALEMIA: ICD-10-CM

## 2021-02-11 LAB — POTASSIUM SERPL-SCNC: 4.4 MMOL/L (ref 3.7–5.3)

## 2021-02-18 ENCOUNTER — HOSPITAL ENCOUNTER (OUTPATIENT)
Dept: CARDIAC CATH/INVASIVE PROCEDURES | Age: 61
Discharge: HOME | End: 2021-02-18
Payer: COMMERCIAL

## 2021-02-18 VITALS
RESPIRATION RATE: 13 BRPM | OXYGEN SATURATION: 97 % | BODY MASS INDEX: 23.03 KG/M2 | WEIGHT: 170 LBS | HEIGHT: 72 IN | HEART RATE: 86 BPM | SYSTOLIC BLOOD PRESSURE: 170 MMHG | TEMPERATURE: 98.4 F | DIASTOLIC BLOOD PRESSURE: 89 MMHG

## 2021-02-18 PROBLEM — I73.9 PERIPHERAL VASCULAR DISEASE, UNSPECIFIED (HCC): Status: ACTIVE | Noted: 2021-02-18

## 2021-02-18 PROCEDURE — 37221 HC ILIAC TERRITORY PLASTY STENT: CPT | Performed by: SURGERY

## 2021-02-18 PROCEDURE — 2709999900 HC NON-CHARGEABLE SUPPLY

## 2021-02-18 PROCEDURE — 75716 ARTERY X-RAYS ARMS/LEGS: CPT | Performed by: SURGERY

## 2021-02-18 PROCEDURE — C1876 STENT, NON-COA/NON-COV W/DEL: HCPCS

## 2021-02-18 PROCEDURE — C1887 CATHETER, GUIDING: HCPCS

## 2021-02-18 PROCEDURE — 7100000000 HC PACU RECOVERY - FIRST 15 MIN

## 2021-02-18 PROCEDURE — 6360000004 HC RX CONTRAST MEDICATION

## 2021-02-18 PROCEDURE — C1769 GUIDE WIRE: HCPCS

## 2021-02-18 PROCEDURE — 75625 CONTRAST EXAM ABDOMINL AORTA: CPT | Performed by: SURGERY

## 2021-02-18 PROCEDURE — 6360000002 HC RX W HCPCS

## 2021-02-18 PROCEDURE — C1760 CLOSURE DEV, VASC: HCPCS

## 2021-02-18 PROCEDURE — 7100000001 HC PACU RECOVERY - ADDTL 15 MIN

## 2021-02-18 PROCEDURE — C1894 INTRO/SHEATH, NON-LASER: HCPCS

## 2021-02-18 PROCEDURE — 2500000003 HC RX 250 WO HCPCS

## 2021-02-18 RX ORDER — ASPIRIN 81 MG/1
81 TABLET, CHEWABLE ORAL DAILY
Status: DISCONTINUED | OUTPATIENT
Start: 2021-02-18 | End: 2021-02-19 | Stop reason: HOSPADM

## 2021-02-18 RX ORDER — SODIUM CHLORIDE 0.9 % (FLUSH) 0.9 %
10 SYRINGE (ML) INJECTION PRN
Status: DISCONTINUED | OUTPATIENT
Start: 2021-02-18 | End: 2021-02-19 | Stop reason: HOSPADM

## 2021-02-18 RX ORDER — CLOPIDOGREL 300 MG/1
300 TABLET, FILM COATED ORAL ONCE
Status: DISCONTINUED | OUTPATIENT
Start: 2021-02-18 | End: 2021-02-19 | Stop reason: HOSPADM

## 2021-02-18 RX ORDER — ACETAMINOPHEN 325 MG/1
650 TABLET ORAL EVERY 4 HOURS PRN
Status: DISCONTINUED | OUTPATIENT
Start: 2021-02-18 | End: 2021-02-19 | Stop reason: HOSPADM

## 2021-02-18 RX ORDER — SODIUM CHLORIDE 9 MG/ML
INJECTION, SOLUTION INTRAVENOUS CONTINUOUS
Status: DISCONTINUED | OUTPATIENT
Start: 2021-02-18 | End: 2021-02-19 | Stop reason: HOSPADM

## 2021-02-18 RX ORDER — SODIUM CHLORIDE 0.9 % (FLUSH) 0.9 %
10 SYRINGE (ML) INJECTION EVERY 12 HOURS SCHEDULED
Status: DISCONTINUED | OUTPATIENT
Start: 2021-02-18 | End: 2021-02-19 | Stop reason: HOSPADM

## 2021-02-18 RX ORDER — ASPIRIN 81 MG/1
81 TABLET, CHEWABLE ORAL DAILY
COMMUNITY

## 2021-02-18 RX ORDER — ONDANSETRON 2 MG/ML
4 INJECTION INTRAMUSCULAR; INTRAVENOUS EVERY 6 HOURS PRN
Status: DISCONTINUED | OUTPATIENT
Start: 2021-02-18 | End: 2021-02-19 | Stop reason: HOSPADM

## 2021-02-18 RX ORDER — OXYCODONE HYDROCHLORIDE AND ACETAMINOPHEN 5; 325 MG/1; MG/1
1 TABLET ORAL ONCE
Status: DISCONTINUED | OUTPATIENT
Start: 2021-02-18 | End: 2021-02-19 | Stop reason: HOSPADM

## 2021-02-18 RX ORDER — CLOPIDOGREL BISULFATE 75 MG/1
75 TABLET ORAL DAILY
Qty: 30 TABLET | Refills: 5 | Status: SHIPPED | OUTPATIENT
Start: 2021-02-18

## 2021-02-18 NOTE — PROGRESS NOTES
Received post procedure to [facility] to room 1 alert and awake. Assessment obtained. Restrictions reviewed with patient. Post procedure pathway initiated. Right and left groins site soft , band aid dry and intact. No hematoma noted. Patient without complaints. Head of bed flat with right and left leg straight. Side rails up 2 of 2 with call light to reach.  Fluids offered

## 2021-02-18 NOTE — OP NOTE
Operative Note      Patient: Rose Marie Santizo  YOB: 1960  MRN: [id number]    Date of Procedure: 2/18/2021    Pre-Op Diagnosis: Disabling claudication bilateral lower extremities right worse than left    Post-Op Diagnosis: Same       Procedure:  1) ultrasound-guided access bilateral common femoral arteries  2) aortogram with right lower extremity runoff  3) bilateral common iliac artery stenting    Surgeon:   Sheri Patrick MD    Assistant:  Karla Hayes DO    Anesthesia: Versed 3mg, Fentanyl 100mcg    Estimated Blood Loss (mL): Minimal    Complications: None    Specimens:   * Cannot find log *    Implants:  * No surgical log found *      Drains: * No LDAs found *    Findings:   1) patent renal and mesenteric vessels bilaterally  2) 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 Focal high-grade stenosis of the left common iliac artery with occluded internal iliac. 3) SFA occlusive disease on the right at Enrico's canal with minimal collaterals and heavy calcification    Detailed Description of Procedure: This is a 78-year-old male who reports progressive disabling claudication of his right leg and bilateral hips. This has been worsening over the past 2 years and more recently has been forced to take time off work because he cannot perform his duties. Arterial ultrasound showed monophasic flow throughout the right lower extremity with some diffuse calcific disease. The patient was brought to the Cath Lab and placed supine, the groins were cleaned and prepped in the usual fashion sterile drapes were applied. Ultrasound was used to identify the left common femoral artery. Skin was anesthetized with lidocaine using a micropuncture needle and catheter access was gained and a 5 Swazi sheath was placed.   The Glidewire was used to place a Omni Flush catheter at the level of the renal vessels and an aortogram

## 2021-02-18 NOTE — PROGRESS NOTES
Patient ambulated around hallway . Gait steady while up . No hematomas or drainage noted to bilateral groins.

## 2021-02-18 NOTE — H&P
[facility]      Patient's Name/ Date of Birth/ Gender: Elizabeth Suggs / 1960 (62 y.o.) / male     History of present Illness: Pt is a 62 y.o. male, seen in consultation for angiogram for peripheral arterial disease. Patient has lower extremity claudication symptoms that start when ambulating for about . 25 miles. Patient admits to smoking 1 pack of cigarettes per day. Past Medical History:  has a past medical history of Essential hypertension. Past Surgical History:   Past Surgical History:   Procedure Laterality Date    COLONOSCOPY      OTHER SURGICAL HISTORY  02/18/2021    RLE angiogram       Social History:  reports that he has been smoking cigarettes. He has a 60.00 pack-year smoking history. He has never used smokeless tobacco. He reports current alcohol use. He reports that he does not use drugs. Family History: family history includes Cancer in his father, mother, and paternal uncle. Review of Systems:   General: Denies fever, chills, night sweats, weight loss, malaise, fatigue  HEENT: Denies sore throat, sinus problems, allergic rhinosinusitis  Card: Denies chest pain, palpitations, orthopnea. Denies h/o murmurs  Pulm: Denies cough, shortness of breath, CONLEY  GI:  per HPI; denies constipation, diarrhea, hematochezia or melena  : Denies polyuria, dysuria, hematuria  Endo: Denies diabetes, thyroid problems. Heme: Denies anemia, h/o bleeding or clotting problems. Neuro: Denies h/o CVA, TIA  Skin: Denies rashes, ulcers  Musculoskeletal: Denies muscle, joint, back pain.     Allergies: Penicillins    Current Meds:  Current Outpatient Medications:     lisinopril (PRINIVIL;ZESTRIL) 40 MG tablet, TAKE 1 TABLET DAILY, Disp: 90 tablet, Rfl: 1    Current Facility-Administered Medications:     0.9 % sodium chloride infusion, , Intravenous, Continuous, Keke Cuello MD    Vital Signs:  Vitals:    02/18/21 1043   BP: (!) 162/99   Pulse: 107   Resp: 16 Temp: 98.4 °F (36.9 °C)   SpO2: 97%       Physical Exam:  Vital signs and Nurse's note reviewed  Gen:  A&Ox3, NAD  HEENT: NCAT, PERRLA, EOMI, no scleral icterus, oral mucosa moist  Neck: Supple, no thyroid enlargement, no cervical or supraclavicular lymphaedenopathy  Chest: Symmetric rise with inhalation, no evidence of trauma  CVS: Regular rate and rhythm, no murmurs  Resp: Good bilateral air entry, no audible wheeze or rhonchi  Abd: soft, non-tender, non-distended  Ext: Peripheral pulses 2+ Rad b/l. 2+ DP/PT left, DP/PT nonpalpable on right. Peripheral edema absent. CNS: Moves all extremities, no gross focal motor deficits      Labs:   Lab Results   Component Value Date    WBC 9.8 02/10/2021    HGB 15.6 02/10/2021    HCT 46.6 02/10/2021    MCV 97.9 02/10/2021     02/10/2021     Lab Results   Component Value Date     02/10/2021    K 4.4 02/11/2021     02/10/2021    CO2 22 02/10/2021    BUN 9 02/10/2021    CREATININE 0.69 02/10/2021    GLUCOSE 97 02/10/2021    CALCIUM 9.8 02/10/2021     Lab Results   Component Value Date    ALKPHOS 67 02/10/2021    ALT 22 02/10/2021    AST 27 02/10/2021    PROT 7.0 02/10/2021    BILITOT 0.58 02/10/2021    LABALBU 4.1 02/10/2021     No results found for: LACTA  No results found for: AMYLASE  No results found for: LIPASE  No results found for: INR    Impression:  1. 62 y.o. male with peripheral arterial disease. Recommendation:  1.  Angiogram     Electronically signed by Johnny Cisneros DPM on 2/18/2021 at 11:04 AM

## 2021-02-18 NOTE — PRE SEDATION
Sedation Pre-Procedure Note    Patient Name: Helaine Ormond   YOB: 1960  Room/Bed: Room/bed info not found  Medical Record Number: [id number]  Date: 2/18/2021   Time: 10:30 AM       Indication:  Disabling right leg claudication    Consent: I have discussed with the patient and/or the patient representative the indication, alternatives, and the possible risks and/or complications of the planned procedure and the anesthesia methods. The patient and/or patient representative appear to understand and agree to proceed. Vital Signs: There were no vitals filed for this visit. Past Medical History:   has a past medical history of Essential hypertension. Past Surgical History:   has a past surgical history that includes Colonoscopy. Medications:   Scheduled Meds:   Continuous Infusions:    sodium chloride       PRN Meds:   Home Meds:   Prior to Admission medications    Medication Sig Start Date End Date Taking? Authorizing Provider   lisinopril (PRINIVIL;ZESTRIL) 40 MG tablet TAKE 1 TABLET DAILY 2/8/21   Марина Love PA-C   metoprolol succinate (TOPROL XL) 25 MG extended release tablet Take 1 tablet by mouth daily 1/12/21   Марина Love PA-C     Coumadin Use Last 7 Days:  no  Antiplatelet drug therapy use last 7 days: no  Other anticoagulant use last 7 days: no  Additional Medication Information:        Pre-Sedation Documentation and Exam:   I have personally completed a history, physical exam & review of systems for this patient (see notes).     Mallampati Airway Assessment:  Mallampati Class II - (soft palate, fauces & uvula are visible)    Prior History of Anesthesia Complications:   none    ASA Classification:  Class 2 - A normal healthy patient with mild systemic disease    Sedation/ Anesthesia Plan:   intravenous sedation    Medications Planned:   midazolam (Versed) intravenously and fentanyl intravenously    Patient is an appropriate candidate for plan of sedation: yes    Electronically signed by Bianca Mcbride MD on 2/18/2021 at 10:30 AM

## 2021-02-18 NOTE — POST SEDATION
Sedation Post Procedure Note    Patient Name: Helaine Ormond   YOB: 1960  Room/Bed: Room/bed info not found  Medical Record Number: [id number]  Date: 2/18/2021   Time: 12:38 PM         Physicians/Assistants: Jessica Ceja MD    Procedure Performed:    1) ultrasound-guided access bilateral common femoral arteries  2) aortogram with right lower extremity runoff  3) bilateral common iliac artery stenting    Post-Sedation Vital Signs:  Vitals:    02/18/21 1118   BP:    Pulse: 103   Resp:    Temp:    SpO2:       Vital signs were reviewed and were stable after the procedure (see flow sheet for vitals)            Post-Sedation Exam: Lungs: clear and Cardiovascular: normal           Complications: none    Electronically signed by Jessica Ceja MD on 2/18/2021 at 12:38 PM

## 2021-02-19 ENCOUNTER — HOSPITAL ENCOUNTER (OUTPATIENT)
Dept: VASCULAR LAB | Age: 61
Discharge: HOME | End: 2021-02-19
Payer: COMMERCIAL

## 2021-02-19 DIAGNOSIS — I73.9 VASCULAR DISEASE, PERIPHERAL (HCC): ICD-10-CM

## 2021-02-19 DIAGNOSIS — I73.9 PERIPHERAL VASCULAR DISEASE, UNSPECIFIED (HCC): Primary | ICD-10-CM

## 2021-02-19 PROCEDURE — 93925 LOWER EXTREMITY STUDY: CPT

## 2021-02-24 ENCOUNTER — TELEPHONE (OUTPATIENT)
Dept: FAMILY MEDICINE CLINIC | Age: 61
End: 2021-02-24

## 2021-03-01 ENCOUNTER — OFFICE VISIT (OUTPATIENT)
Dept: FAMILY MEDICINE CLINIC | Age: 61
End: 2021-03-01
Payer: COMMERCIAL

## 2021-03-01 VITALS
HEART RATE: 118 BPM | BODY MASS INDEX: 22.75 KG/M2 | OXYGEN SATURATION: 94 % | WEIGHT: 168 LBS | SYSTOLIC BLOOD PRESSURE: 136 MMHG | HEIGHT: 72 IN | DIASTOLIC BLOOD PRESSURE: 86 MMHG

## 2021-03-01 DIAGNOSIS — R94.31 ABNORMAL EKG: ICD-10-CM

## 2021-03-01 DIAGNOSIS — I73.9 PAD (PERIPHERAL ARTERY DISEASE) (HCC): Primary | ICD-10-CM

## 2021-03-01 DIAGNOSIS — Z72.0 TOBACCO ABUSE: ICD-10-CM

## 2021-03-01 PROCEDURE — 99214 OFFICE O/P EST MOD 30 MIN: CPT | Performed by: PHYSICIAN ASSISTANT

## 2021-03-01 PROCEDURE — 93000 ELECTROCARDIOGRAM COMPLETE: CPT | Performed by: PHYSICIAN ASSISTANT

## 2021-03-01 RX ORDER — METOPROLOL SUCCINATE 25 MG/1
25 TABLET, EXTENDED RELEASE ORAL DAILY
COMMUNITY
End: 2021-03-05 | Stop reason: SDUPTHER

## 2021-03-01 ASSESSMENT — ENCOUNTER SYMPTOMS
COLOR CHANGE: 0
WHEEZING: 0
ABDOMINAL PAIN: 0
NAUSEA: 0
CONSTIPATION: 0
SHORTNESS OF BREATH: 0
DIARRHEA: 0
VOMITING: 0
COUGH: 0

## 2021-03-01 NOTE — PROGRESS NOTES
Visit Information    Have you changed or started any medications since your last visit including any over-the-counter medicines, vitamins, or herbal medicines? no   Are you having any side effects from any of your medications? -  no  Have you stopped taking any of your medications? Is so, why? -  no    Have you seen any other physician or provider since your last visit? No  Have you had any other diagnostic tests since your last visit? No  Have you been seen in the emergency room and/or had an admission to a hospital since we last saw you? No  Have you had your routine dental cleaning in the past 6 months? no    Have you activated your [employer] account? If not, what are your barriers?  No:      Patient Care Team:  Shannan Smith PA-C as PCP - General (Family Medicine)  Shannan Smith PA-C as PCP - [facility]    Medical History Review  Past Medical, Family, and Social History reviewed and does contribute to the patient presenting condition    Health Maintenance   Topic Date Due    Hepatitis C screen  1960    Pneumococcal 0-64 years Vaccine (1 of 1 - PPSV23) 02/28/1967    HIV screen  02/28/1976    Colon cancer screen colonoscopy  02/28/2011    Low dose CT lung screening  02/28/2016    Flu vaccine (1) 01/12/2022 (Originally 9/1/2020)    Shingles Vaccine (1 of 2) 01/12/2022 (Originally 2/28/2011)    DTaP/Tdap/Td vaccine (2 - Td) 09/01/2031 (Originally 9/1/2030)    Creatinine monitoring  02/10/2022    Potassium monitoring  02/11/2022    Lipid screen  02/10/2026    Hepatitis A vaccine  Aged Out    Hepatitis B vaccine  Aged Out    Hib vaccine  Aged Out    Meningococcal (ACWY) vaccine  Aged Out

## 2021-03-01 NOTE — PROGRESS NOTES
[address] [facility]  [address]  [address] [facility] [address]  Dept: [phone]  Dept Fax: [phone]    Kavita Wheat is a 62 y.o. male who presents today for his medical conditions/complaintsas noted below. Kavita Wheat is c/o of   Chief Complaint   Patient presents with    Post-Op Check     patient has surgery for artery blockage, wants to have his heart and neck checked. HPI:     HPI    Patient here for follow up after surgery for PAD with Dr. Fannie Silva 2 weeks ago. \"Occlusion of the right common iliac artery with diffuse disease of the external iliac and internal iliac arteries on the right. Focal high-grade stenosis of the left common iliac artery with occluded internal iliac\" per vascular report. 2 stents were placed. He has been walking more and states tolerating longer distances. Going to the mall to walk 2-3 times a week. He can make it further now before needing to sit down. He states incision is healing well, no redness or drainage. Patient states was released to be more active later this week. Vascular has him off until 3/9/21. He has follow up with the surgeon in 1 year. Patient was advised to get cardiac testing done. He states notes no chest pain or shortness of breath. He is still smoking but cutting back.      No results found for: LABA1C          ( goal A1Cis < 7)   No results found for: LABMICR  LDL Cholesterol (mg/dL)   Date Value   02/10/2021 75   04/18/2019 133 (H)   02/13/2015 136 (H)     LDL Calculated (mg/dL)   Date Value   07/31/2017 97       (goal LDL is <100)   AST (U/L)   Date Value   02/10/2021 27     ALT (U/L)   Date Value   02/10/2021 22     BUN (mg/dL)   Date Value   02/10/2021 9     BP Readings from Last 3 Encounters:   03/01/21 136/86   02/18/21 (!) 170/89   01/12/21 (!) 142/92          (goal 120/80)    Past Medical History:   Diagnosis Date    Claudication [facility])     Essential hypertension 2016    PVD (peripheral vascular disease) [facility])       Past Surgical History:   Procedure Laterality Date    COLONOSCOPY      OTHER SURGICAL HISTORY  2021    RLE angiogram  /  Dr Ortiz Corporal  /  bilateral common iliac artery stenting       Family History   Problem Relation Age of Onset    Cancer Mother         pancreatic    Cancer Father         colon    Cancer Paternal Uncle         colon       Social History     Tobacco Use    Smoking status: Current Every Day Smoker     Packs/day: 1.50     Years: 40.00     Pack years: 60.00     Types: Cigarettes     Last attempt to quit: 2015     Years since quittin.0    Smokeless tobacco: Never Used   Substance Use Topics    Alcohol use: Yes     Alcohol/week: 0.0 - 10.0 standard drinks      Current Outpatient Medications   Medication Sig Dispense Refill    clopidogrel (PLAVIX) 75 MG tablet Take 1 tablet by mouth daily 30 tablet 5    aspirin 81 MG chewable tablet Take 81 mg by mouth daily      lisinopril (PRINIVIL;ZESTRIL) 40 MG tablet TAKE 1 TABLET DAILY 90 tablet 1     No current facility-administered medications for this visit.       Allergies   Allergen Reactions    Penicillins Rash       Health Maintenance   Topic Date Due    Hepatitis C screen  1960    Pneumococcal 0-64 years Vaccine (1 of 1 - PPSV23) 1967    HIV screen  1976    Colon cancer screen colonoscopy  2011    Low dose CT lung screening  2016    Flu vaccine (1) 2022 (Originally 2020)    Shingles Vaccine (1 of 2) 2022 (Originally 2011)    DTaP/Tdap/Td vaccine (2 - Td) 2031 (Originally 2030)    Creatinine monitoring  02/10/2022    Potassium monitoring  2022    Lipid screen  02/10/2026    Hepatitis A vaccine  Aged Out    Hepatitis B vaccine  Aged Out    Hib vaccine  Aged Out    Meningococcal (ACWY) vaccine  Aged Out       Subjective:     Review of Systems   Constitutional: Negative for activity change, appetite change, fatigue, fever and unexpected weight change. /86   Pulse 118   Ht 6' (1.829 m)   Wt 168 lb (76.2 kg)   SpO2 94%   BMI 22.78 kg/m²    Respiratory: Negative for cough, shortness of breath and wheezing. Cardiovascular: Negative for chest pain and palpitations. Gastrointestinal: Negative for abdominal pain, constipation, diarrhea, nausea and vomiting. Skin: Negative for color change, pallor, rash and wound. Neurological: Negative for weakness. Psychiatric/Behavioral: The patient is not nervous/anxious. Objective:     Physical Exam  Vitals signs and nursing note reviewed. Constitutional:       General: He is not in acute distress. Appearance: Normal appearance. He is well-developed. He is not ill-appearing. HENT:      Head: Normocephalic and atraumatic. Cardiovascular:      Rate and Rhythm: Regular rhythm. Tachycardia present. Heart sounds: No murmur. Pulmonary:      Effort: Pulmonary effort is normal. No respiratory distress. Breath sounds: Normal breath sounds. No wheezing, rhonchi or rales. Skin:     General: Skin is warm and dry. Coloration: Skin is not pale. Findings: No erythema or rash. Neurological:      Mental Status: He is alert and oriented to person, place, and time. Psychiatric:         Mood and Affect: Mood normal.         Behavior: Behavior normal.         Thought Content: Thought content normal.         Judgment: Judgment normal.       /86   Pulse 118   Ht 6' (1.829 m)   Wt 168 lb (76.2 kg)   SpO2 94%   BMI 22.78 kg/m²     Assessment:       Diagnosis Orders   1. PAD (peripheral artery disease) ([facility])  EKG 12 Lead    Cardiac Stress Test - w/Pharm    VL DUP CAROTID BILATERAL   2. Tobacco abuse  EKG 12 Lead    Cardiac Stress Test - w/Pharm    VL DUP CAROTID BILATERAL   3.  Abnormal EKG  Cardiac Stress Test - w/Pharm    VL DUP CAROTID BILATERAL             Plan:      Return if symptoms worsen or fail to improve, for await results. EKG here with tachycardia noted. Reviewed case and treatment with Dr. Alexander Carson up for carotid US and lexiscan stress test to further evalutate  Continue with working on smoking cessation, stressed need  Patient now on ASA and plavix, continue  Recheck after results reviewed  Patient agreed with treatment plan    Orders Placed This Encounter   Procedures    VL DUP CAROTID BILATERAL     Standing Status:   Future     Standing Expiration Date:   3/1/2022     Order Specific Question:   Reason for exam:     Answer:   PAD    Cardiac Stress Test - w/Pharm     Standing Status:   Future     Standing Expiration Date:   3/1/2022     Order Specific Question:   Reason for Exam?     Answer:   EKG abnormalities     Order Specific Question:   Does patient have left bundle branch block (LBBB) or left ventricular hypertrophy (LVH) based on resting ECG, a ventricular pacemaker, or is unable to exercise on a treadmill based on physical or mental limitations? Answer: Yes    EKG 12 Lead     Order Specific Question:   Reason for Exam?     Answer:   Chest pain         Patient given educational materials - see patient instructions. Discussed use, benefit, and side effects of prescribed medications. All patientquestions answered. Pt voiced understanding. Reviewed health maintenance. Instructedto continue current medications, diet and exercise. Patient agreed with treatmentplan. Follow up as directed.      Electronically signed by Clau Aponte PA-C on 3/1/2021 at 11:18 AM

## 2021-03-02 ENCOUNTER — TELEPHONE (OUTPATIENT)
Dept: FAMILY MEDICINE CLINIC | Age: 61
End: 2021-03-02

## 2021-03-02 DIAGNOSIS — Z72.0 TOBACCO ABUSE: ICD-10-CM

## 2021-03-02 DIAGNOSIS — R94.31 ABNORMAL EKG: ICD-10-CM

## 2021-03-02 DIAGNOSIS — I73.9 PAD (PERIPHERAL ARTERY DISEASE) (HCC): Primary | ICD-10-CM

## 2021-03-02 NOTE — TELEPHONE ENCOUNTER
Scheduling called stating that the stress test needs to be with dexa. She needs Марина to place the new order and she will just keep an eye our for the new order.

## 2021-03-05 RX ORDER — METOPROLOL SUCCINATE 25 MG/1
25 TABLET, EXTENDED RELEASE ORAL DAILY
Qty: 90 TABLET | Refills: 1 | Status: SHIPPED | OUTPATIENT
Start: 2021-03-05 | End: 2022-10-13 | Stop reason: DRUGHIGH

## 2021-03-15 ENCOUNTER — HOSPITAL ENCOUNTER (OUTPATIENT)
Dept: NUCLEAR MEDICINE | Age: 61
Discharge: HOME | End: 2021-03-17
Payer: COMMERCIAL

## 2021-03-15 ENCOUNTER — HOSPITAL ENCOUNTER (OUTPATIENT)
Dept: NON INVASIVE DIAGNOSTICS | Age: 61
Discharge: HOME | End: 2021-03-15
Payer: COMMERCIAL

## 2021-03-15 ENCOUNTER — HOSPITAL ENCOUNTER (OUTPATIENT)
Dept: VASCULAR LAB | Age: 61
Discharge: HOME | End: 2021-03-15
Payer: COMMERCIAL

## 2021-03-15 VITALS — SYSTOLIC BLOOD PRESSURE: 146 MMHG | HEART RATE: 100 BPM | DIASTOLIC BLOOD PRESSURE: 83 MMHG | RESPIRATION RATE: 18 BRPM

## 2021-03-15 DIAGNOSIS — Z72.0 TOBACCO ABUSE: ICD-10-CM

## 2021-03-15 DIAGNOSIS — I73.9 PAD (PERIPHERAL ARTERY DISEASE) (HCC): ICD-10-CM

## 2021-03-15 DIAGNOSIS — R94.31 ABNORMAL EKG: ICD-10-CM

## 2021-03-15 LAB
LV EF: 76 %
LVEF MODALITY: NORMAL

## 2021-03-15 PROCEDURE — 6360000002 HC RX W HCPCS: Performed by: PHYSICIAN ASSISTANT

## 2021-03-15 PROCEDURE — 2580000003 HC RX 258: Performed by: PHYSICIAN ASSISTANT

## 2021-03-15 PROCEDURE — 93017 CV STRESS TEST TRACING ONLY: CPT

## 2021-03-15 PROCEDURE — 78452 HT MUSCLE IMAGE SPECT MULT: CPT

## 2021-03-15 PROCEDURE — 3430000000 HC RX DIAGNOSTIC RADIOPHARMACEUTICAL: Performed by: PHYSICIAN ASSISTANT

## 2021-03-15 PROCEDURE — 93880 EXTRACRANIAL BILAT STUDY: CPT

## 2021-03-15 PROCEDURE — A9500 TC99M SESTAMIBI: HCPCS | Performed by: PHYSICIAN ASSISTANT

## 2021-03-15 RX ORDER — AMINOPHYLLINE DIHYDRATE 25 MG/ML
50 INJECTION, SOLUTION INTRAVENOUS PRN
Status: DISCONTINUED | OUTPATIENT
Start: 2021-03-15 | End: 2021-03-15 | Stop reason: HOSPADM

## 2021-03-15 RX ORDER — SODIUM CHLORIDE 0.9 % (FLUSH) 0.9 %
10 SYRINGE (ML) INJECTION PRN
Status: DISCONTINUED | OUTPATIENT
Start: 2021-03-15 | End: 2021-03-15 | Stop reason: HOSPADM

## 2021-03-15 RX ORDER — ALBUTEROL SULFATE 90 UG/1
2 AEROSOL, METERED RESPIRATORY (INHALATION) PRN
Status: DISCONTINUED | OUTPATIENT
Start: 2021-03-15 | End: 2021-03-15 | Stop reason: HOSPADM

## 2021-03-15 RX ORDER — NITROGLYCERIN 0.4 MG/1
0.4 TABLET SUBLINGUAL EVERY 5 MIN PRN
Status: DISCONTINUED | OUTPATIENT
Start: 2021-03-15 | End: 2021-03-15 | Stop reason: HOSPADM

## 2021-03-15 RX ORDER — SODIUM CHLORIDE 9 MG/ML
500 INJECTION, SOLUTION INTRAVENOUS CONTINUOUS PRN
Status: DISCONTINUED | OUTPATIENT
Start: 2021-03-15 | End: 2021-03-15 | Stop reason: HOSPADM

## 2021-03-15 RX ORDER — ATROPINE SULFATE 0.1 MG/ML
0.5 INJECTION INTRAVENOUS EVERY 5 MIN PRN
Status: DISCONTINUED | OUTPATIENT
Start: 2021-03-15 | End: 2021-03-15 | Stop reason: HOSPADM

## 2021-03-15 RX ORDER — METOPROLOL TARTRATE 5 MG/5ML
5 INJECTION INTRAVENOUS EVERY 5 MIN PRN
Status: DISCONTINUED | OUTPATIENT
Start: 2021-03-15 | End: 2021-03-15 | Stop reason: HOSPADM

## 2021-03-15 RX ADMIN — TETRAKIS(2-METHOXYISOBUTYLISOCYANIDE)COPPER(I) TETRAFLUOROBORATE 19.8 MILLICURIE: 1 INJECTION, POWDER, LYOPHILIZED, FOR SOLUTION INTRAVENOUS at 08:10

## 2021-03-15 RX ADMIN — REGADENOSON 0.4 MG: 0.08 INJECTION, SOLUTION INTRAVENOUS at 08:09

## 2021-03-15 RX ADMIN — TETRAKIS(2-METHOXYISOBUTYLISOCYANIDE)COPPER(I) TETRAFLUOROBORATE 42.5 MILLICURIE: 1 INJECTION, POWDER, LYOPHILIZED, FOR SOLUTION INTRAVENOUS at 11:50

## 2021-03-15 RX ADMIN — SODIUM CHLORIDE, PRESERVATIVE FREE 10 ML: 5 INJECTION INTRAVENOUS at 08:09

## 2021-03-15 NOTE — PROCEDURES
[address]                 [address] [facility], [address] [facility]                              CARDIAC STRESS TEST    PATIENT NAME: John Nair                :        1960  MED REC NO:   [id number]                             ROOM:  ACCOUNT NO:   [de-identified]                           ADMIT DATE: 03/15/2021  PROVIDER:     Deepthi Lorenzo    DATE OF STUDY:  03/15/2021    LEXISCAN MYOVIEW STRESS TEST    ATTENDING PROVIDER:  Khalida Huang    PRIMARY CARE PROVIDER:  Марина Wilburn PA-C    PERFORMING PHYSICIAN: Deepthi Lorenzo MD    INDICATION:  EKG abnormalities. HEART RATE  100% max predicted heart rate:  160  85% max predicted heart rate:  136  Duration:  1:00    Resting heart rate:  93  Maximum heart rate achieved:  117  % of predicted maximum:  73    BLOOD PRESSURE  Resting BP:  143/88  Peak BP:  169/95  METS:  1.0    MEDICATIONS GIVEN:  0.4 mg Lexiscan    REASON FOR TERMINATION:   Medication infusion complete. BASELINE EKG DEMONSTRATED:  Sinus rhythm. Right bundle branch block. During the stress test, the patient reported: No symptoms. STRESS EKG DEMONSTRATED:  No abnormal change. HEART RATE RESPONSE:   Normal response. BLOOD PRESSURE RESPONSE:   Attenuated. ECG IMPRESSION:  Negative. FINAL IMPRESSION:  Negative. Nuclear medicine report to follow.           Sobia Holt    D: 03/15/2021 8:51:52       T: 03/15/2021 8:53:26     [email or web address]  Job#: [id number]     Doc#: Unknown

## 2021-03-15 NOTE — PROGRESS NOTES
Patient tolerated lexiscan stress test without side effect. Recovered on cart. Patient awaiting further testing.

## 2021-03-17 ENCOUNTER — TELEPHONE (OUTPATIENT)
Dept: FAMILY MEDICINE CLINIC | Age: 61
End: 2021-03-17

## 2021-03-18 ENCOUNTER — TELEPHONE (OUTPATIENT)
Dept: FAMILY MEDICINE CLINIC | Age: 61
End: 2021-03-18

## 2021-03-18 NOTE — TELEPHONE ENCOUNTER
Call patient please. With his recent testing being negative and vascular recommending return to work on 3/1/21 I don't really have a medical reason to keep him off work until April as he requested. If he is having issues walking please have him follow up with vascular.  For now I have completed his off work paperwork with a RTW date of this coming Monday 3/22/21

## 2021-12-14 ENCOUNTER — NURSE TRIAGE (OUTPATIENT)
Dept: OTHER | Facility: CLINIC | Age: 61
End: 2021-12-14

## 2021-12-14 ENCOUNTER — NURSE ONLY (OUTPATIENT)
Dept: PRIMARY CARE CLINIC | Age: 61
End: 2021-12-14

## 2021-12-14 ENCOUNTER — VIRTUAL VISIT (OUTPATIENT)
Dept: FAMILY MEDICINE CLINIC | Age: 61
End: 2021-12-14
Payer: COMMERCIAL

## 2021-12-14 ENCOUNTER — HOSPITAL ENCOUNTER (OUTPATIENT)
Age: 61
Setting detail: SPECIMEN
Discharge: HOME | End: 2021-12-14

## 2021-12-14 DIAGNOSIS — Z87.891 PERSONAL HISTORY OF NICOTINE DEPENDENCE: ICD-10-CM

## 2021-12-14 DIAGNOSIS — M51.36 DDD (DEGENERATIVE DISC DISEASE), LUMBAR: ICD-10-CM

## 2021-12-14 DIAGNOSIS — I73.9 PAD (PERIPHERAL ARTERY DISEASE) (HCC): ICD-10-CM

## 2021-12-14 DIAGNOSIS — R20.0 NUMBNESS AND TINGLING OF LEFT LEG: Primary | ICD-10-CM

## 2021-12-14 DIAGNOSIS — Z20.822 EXPOSURE TO COVID-19 VIRUS: ICD-10-CM

## 2021-12-14 DIAGNOSIS — R20.0 NUMBNESS OF RIGHT FOOT: ICD-10-CM

## 2021-12-14 DIAGNOSIS — R20.2 NUMBNESS AND TINGLING OF LEFT LEG: Primary | ICD-10-CM

## 2021-12-14 PROCEDURE — 99442 PR PHYS/QHP TELEPHONE EVALUATION 11-20 MIN: CPT | Performed by: NURSE PRACTITIONER

## 2021-12-14 NOTE — PROGRESS NOTES
Visit Information    Have you changed or started any medications since your last visit including any over-the-counter medicines, vitamins, or herbal medicines? no   Have you stopped taking any of your medications? Is so, why? -  no  Are you having any side effects from any of your medications? - no    Have you seen any other physician or provider since your last visit?  no   Have you had any other diagnostic tests since your last visit?  no   Have you been seen in the emergency room and/or had an admission in a hospital since we last saw you?  no   Have you had your routine dental cleaning in the past 6 months?  no     Do you have an active MyChart account? If no, what is the barrier?   No: na    Patient Care Team:  Rafal Sue PA-C as PCP - General (Family Medicine)  Rafal Sue PA-C as PCP - [address] [place] [address] Dr Marino Provider    Medical History Review  Past Medical, Family, and Social History reviewed and  contribute to the patient presenting condition    Health Maintenance   Topic Date Due    Hepatitis C screen  Never done    Pneumococcal 0-64 years Vaccine (1 of 2 - PPSV23) Never done    COVID-19 Vaccine (1) Never done    HIV screen  Never done    Colon cancer screen colonoscopy  Never done    Low dose CT lung screening  Never done    Flu vaccine (1) Never done    Shingles Vaccine (1 of 2) 01/12/2022 (Originally 2/28/2011)    DTaP/Tdap/Td vaccine (2 - Td or Tdap) 09/01/2031 (Originally 9/1/2030)    Creatinine monitoring  02/10/2022    Potassium monitoring  02/11/2022    Lipid screen  02/10/2026    Hepatitis A vaccine  Aged Out    Hepatitis B vaccine  Aged Out    Hib vaccine  Aged Out    Meningococcal (ACWY) vaccine  Aged Out

## 2021-12-14 NOTE — PROGRESS NOTES
Tia Moscoso is a 62 y.o. male evaluated via telephone on 12/14/2021. Consent:  He and/or health care decision maker is aware that that he may receive a bill for this telephone service, depending on his insurance coverage, and has provided verbal consent to proceed: Yes      Documentation:  I communicated with the patient and/or health care decision maker about:  Patient calling in today for evaluation of new onset left leg tingling and right foot numbness when standing. States he works at [employer] and used to have a sitting style job but now has a new job and is on his feet much more than he was in the past.  He denies any redness swelling or tenderness pain to either leg or foot. Denies injury, trauma or fall. States he does have a history of peripheral artery disease and did have bilateral iliac artery stent placement in February 2021 but has not seen his vascular specialist since then as they told him to follow-up in 1 year. He also states he has a history of chronic low back pain for which his last lumbar x-ray was done on 1/12/2021 showing degenerative changes but states he not currently have any low back pain at this time. He has been regular stretches and taking frequent breaks with sitting and also was fitted for orthotics for his shoes and has good supportive shoes when working with no relief in his problem. He wonders what could be the source of this and what he can do moving forward to help prevent. Denies leg weakness, falls, fever or chills. Still would like Covid 19 test for ear pain, sore throat and known exposure at his job site      Details of this discussion including any medical advice provided:   Patient chart updated. He was given the phone number for his vascular specialist to call for an appointment sooner than 1 year. This could be peripheral artery disease related and/or related to his degenerative changes in his low back.   I have ordered an MRI of his lumbar spine to evaluate for any worsening changes at this time as he has had an x-ray and completed physical therapy within the last 1 year. Continue with orthotics, regular stretching and taking frequent sitting breaks while at work if possible to help with this problem. Please follow-up with vascular as soon as possible. Patient given phone number to schedule for MRI and CT lung screening due. Will request report of colonoscopy done at [facility] a few years ago. Patient declines Covid vaccines. Diagnosis Orders   1. Numbness and tingling of left leg  MRI LUMBAR SPINE WO CONTRAST   2. Numbness of right foot  MRI LUMBAR SPINE WO CONTRAST   3. PAD (peripheral artery disease) ([address]     4. DDD (degenerative disc disease), lumbar  MRI LUMBAR SPINE WO CONTRAST   5. Personal history of nicotine dependence  CT lung screen [Initial/Annual]   6. Exposure to COVID-19 virus  COVID-19     Narrative   EXAMINATION:   THREE XRAY VIEWS OF THE LUMBAR SPINE       1/12/2021 10:30 am       COMPARISON:   None.       HISTORY:   ORDERING SYSTEM PROVIDED HISTORY: Chronic right-sided low back pain without   sciatica   TECHNOLOGIST PROVIDED HISTORY:   low back pain   Reason for Exam: Low back pain from fall.    Acuity: Acute   Type of Exam: Initial       FINDINGS:   5 lumbar type vertebral bodies.  Straightening.  Mild wedging anteriorly L1   and probable slight loss of height visualized lower thoracic vertebral   bodies.  No acute appearing fracture, or significant malalignment.       Mild intervertebral disc space narrowing L5-S1; remainder intervertebral disc   spaces maintained.  Mild-moderate spondylosis, more severe upper lumbar spine   and TL junction.  Moderate facet arthropathy, most severe L4-S1.       Pedicles and paravertebral soft tissue structures maintained.  SI joints   patent.  Bones appear borderline osteopenic.  No destructive or blastic   lesion.       Extensive vascular calcifications aorta and iliac arteries without obvious   aneurysm.  Some retained stool, best seen right colon.           Impression   No acute abnormality lumbosacral spine.       Multilevel degenerative and other findings, as above.         Narrative [facility]    Vascular Lower Extremities Arterial Duplex Procedure        Patient Name  KATHERINE      Date of Study           02/04/2021                  CODIE ROBLEDO        Date of Birth 1960  Gender                  Male        Age           62 year(s)  Race                            Room Number   op        Corporate ID  [id number]    #        Patient Acct [de-identified]    #        MR #          [id number]     Sonographer             Radha Aniket T        Accession #   [id number]   Interpreting Physician  Delos Reyes,Arthur        Referring                 Referring Physician     Keily Gonzalez PA-C    Nurse    Practitioner       Procedure   Type of Study:        Extremities Arteries: Lower Extremities Arterial Duplex, Arterial Scan    Lower Right.       Indications for Study:Claudication.       Patient Status:Out Patient. Technical Quality:Limited visualization. Limitation reason:Due to plaque along the arterial walls.         - Critical Result:Марина Love PA-C at 10:35 am.        Conclusions        Summary        Patent arteries of the right lower extremity with diffuse atheroscloerotic    disease. Monophasic flow noted throughout the right lower extremity.        Signature       I affirm this is a Patient Initiated Episode with a Patient who has not had a related appointment within my department in the past 7 days or scheduled within the next 24 hours.     Patient identification was verified at the start of the visit: Yes    Total Time: minutes: 11-20 minutes    The visit was conducted pursuant to the emergency declaration under the [facility], [address] [place] [address] authority and the [facility] and Response Supplemental Appropriations Act. Patient identification was verified, and a caregiver was present when appropriate. The patient was located in a state where the provider was credentialed to provide care.     Note: not billable if this call serves to triage the patient into an appointment for the relevant concern      MISA Gauthier - CNP

## 2021-12-14 NOTE — TELEPHONE ENCOUNTER
Reason for Disposition   Patient wants to be seen    Answer Assessment - Initial Assessment Questions  1. SYMPTOM: \"What is the main symptom you are concerned about? \" (e.g., weakness, numbness)      Pt states when he is standing for approx more than 10 minutes his legs start to tingle and he begins to have numbness in his right foot. 2. ONSET: \"When did this start? \" (minutes, hours, days; while sleeping)      2-3 weeks. Pt went and bought new shoes but it doesn't help. Pt states that it seems to happen only when he is standing for more than 10 minutes. Pt states that he had bilateral leg stents placed in April or May. 3. LAST NORMAL: \"When was the last time you were normal (no symptoms)? 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 4. PATTERN \"Does this come and go, or has it been constant since it started? \"  \"Is it present now? \"    Constant, \"every time I stand\"    5. CARDIAC SYMPTOMS: \"Have you had any of the following symptoms: chest pain, difficulty breathing, palpitations? \"      Denies    6. NEUROLOGIC SYMPTOMS: \"Have you had any of the following symptoms: headache, dizziness, vision loss, double vision, changes in speech, unsteady on your feet? \"      Denies    7. OTHER SYMPTOMS: \"Do you have any other symptoms? \"      Pt denies but does state that he is on 75mg QD of Clopidogrel for having the bilateral stents. 8. PREGNANCY: \"Is there any chance you are pregnant? \" \"When was your last menstrual period? \"      NA    Protocols used: NEUROLOGIC DEFICIT-ADULT-[place]    Received call from [address] [place] [address] [facility] with [employer]. Brief description of triage: see above    Triage indicates for patient to be seen in office today or go to [address] [place] [address] if unable to be seen today.     Care advice provided, patient verbalizes understanding; denies any other questions or concerns; instructed to call back for any new or worsening symptoms. Writer provided warm transfer to Rita at [facility] for appointment scheduling. Attention Provider: Thank you for allowing me to participate in the care of your patient. The patient was connected to triage in response to information provided to the ECC/PSC. Please do not respond through this encounter as the response is not directed to a shared pool.

## 2021-12-14 NOTE — PROGRESS NOTES
Patient presents for COVID-19 testing. Order accessible in [address] [place] [address] Rd. Patient swabbed and tolerated well.      Electronically signed by MISA Potts CNP on 12/14/2021 at 2:40 PM

## 2021-12-15 DIAGNOSIS — Z20.822 EXPOSURE TO COVID-19 VIRUS: ICD-10-CM

## 2021-12-15 LAB
SARS-COV-2: NORMAL
SARS-COV-2: NOT DETECTED
SOURCE: NORMAL

## 2022-10-13 ENCOUNTER — OFFICE VISIT (OUTPATIENT)
Dept: FAMILY MEDICINE CLINIC | Age: 62
End: 2022-10-13
Payer: COMMERCIAL

## 2022-10-13 VITALS
TEMPERATURE: 97.1 F | BODY MASS INDEX: 22.21 KG/M2 | WEIGHT: 164 LBS | HEART RATE: 71 BPM | OXYGEN SATURATION: 98 % | SYSTOLIC BLOOD PRESSURE: 138 MMHG | DIASTOLIC BLOOD PRESSURE: 84 MMHG | HEIGHT: 72 IN

## 2022-10-13 DIAGNOSIS — I10 ESSENTIAL HYPERTENSION: Primary | ICD-10-CM

## 2022-10-13 DIAGNOSIS — S00.81XA ABRASION OF FACE, INITIAL ENCOUNTER: ICD-10-CM

## 2022-10-13 DIAGNOSIS — Z72.0 TOBACCO ABUSE: ICD-10-CM

## 2022-10-13 DIAGNOSIS — R94.31 ABNORMAL EKG: ICD-10-CM

## 2022-10-13 PROCEDURE — 93000 ELECTROCARDIOGRAM COMPLETE: CPT | Performed by: PHYSICIAN ASSISTANT

## 2022-10-13 PROCEDURE — 99214 OFFICE O/P EST MOD 30 MIN: CPT | Performed by: PHYSICIAN ASSISTANT

## 2022-10-13 RX ORDER — METOPROLOL SUCCINATE 50 MG/1
50 TABLET, EXTENDED RELEASE ORAL DAILY
Qty: 30 TABLET | Refills: 5 | Status: SHIPPED | OUTPATIENT
Start: 2022-10-13

## 2022-10-13 RX ORDER — NICOTINE 21 MG/24HR
1 PATCH, TRANSDERMAL 24 HOURS TRANSDERMAL EVERY 24 HOURS
Qty: 30 PATCH | Refills: 3 | Status: SHIPPED | OUTPATIENT
Start: 2022-10-13 | End: 2023-10-13

## 2022-10-13 ASSESSMENT — ENCOUNTER SYMPTOMS
WHEEZING: 0
DIARRHEA: 0
COLOR CHANGE: 0
CONSTIPATION: 0
NAUSEA: 0
VOMITING: 0
ABDOMINAL PAIN: 0
COUGH: 0
SHORTNESS OF BREATH: 0

## 2022-10-13 ASSESSMENT — PATIENT HEALTH QUESTIONNAIRE - PHQ9
1. LITTLE INTEREST OR PLEASURE IN DOING THINGS: 0
SUM OF ALL RESPONSES TO PHQ QUESTIONS 1-9: 0
SUM OF ALL RESPONSES TO PHQ9 QUESTIONS 1 & 2: 0
SUM OF ALL RESPONSES TO PHQ QUESTIONS 1-9: 0
2. FEELING DOWN, DEPRESSED OR HOPELESS: 0
SUM OF ALL RESPONSES TO PHQ QUESTIONS 1-9: 0
SUM OF ALL RESPONSES TO PHQ QUESTIONS 1-9: 0

## 2022-10-13 NOTE — PROGRESS NOTES
[address] [facility]  [address]  [address] [address]  Dept: [phone]  Dept Fax: [phone]    Manohar Summers is a 65 y.o. male who presents today for his medical conditions/complaintsas noted below. Manohar Summers is c/o of   Chief Complaint   Patient presents with    Hypertension    Fall     Patient fell out of his tree stand Sunday night about 6 feet         HPI:     HPI    Patient states he was on a ladder tree stand 6 days ago and was going up with his cross bow and the strap broke so he fell down 6 feet with ladder stand on him. He states no LOC. He scraped his face up some. Did not get seen that day. He went home and rested but went to work the next day. His employer sent him to get medical eval Monday and states BP was high that day so they placed him on leave. He has paperwork here with readings. He checked it at home once since. Patient works at [employer]  Patient states other than feeling scraped up he is doing well. No CP or SOB. No headaches or dizziness. Patient is interested in getting off smoking. He is presently on 1 ppd.      No results found for: LABA1C          ( goal A1Cis < 7)   No results found for: LABMICR  LDL Cholesterol (mg/dL)   Date Value   02/10/2021 75   04/18/2019 133 (H)   02/13/2015 136 (H)     LDL Calculated (mg/dL)   Date Value   07/31/2017 97       (goal LDL is <100)   AST (U/L)   Date Value   02/10/2021 27     ALT (U/L)   Date Value   02/10/2021 22     BUN (mg/dL)   Date Value   02/10/2021 9     BP Readings from Last 3 Encounters:   10/13/22 138/84   03/15/21 (!) 146/83   03/01/21 136/86          (goal 120/80)    Past Medical History:   Diagnosis Date    Claudication [facility])     Essential hypertension 01/22/2016    PAD (peripheral artery disease) (HCC)     PVD (peripheral vascular disease) ([facility]       Past Surgical History:   Procedure Laterality Date    COLONOSCOPY OTHER SURGICAL HISTORY  2021    RLE angiogram  /  Dr Andersen Backbone  /  bilateral common iliac artery stenting    VASCULAR SURGERY Bilateral     illiac artery stenting       Family History   Problem Relation Age of Onset    Cancer Mother         pancreatic    Cancer Father         colon    Cancer Paternal Uncle         colon       Social History     Tobacco Use    Smoking status: Light Smoker     Packs/day: 0.50     Years: 40.00     Pack years: 20.00     Types: Cigarettes     Last attempt to quit: 2015     Years since quittin.6    Smokeless tobacco: Never   Substance Use Topics    Alcohol use: Yes     Alcohol/week: 0.0 - 10.0 standard drinks      Current Outpatient Medications   Medication Sig Dispense Refill    nicotine (NICODERM CQ) 21 MG/24HR Place 1 patch onto the skin every 24 hours 30 patch 3    metoprolol succinate (TOPROL XL) 50 MG extended release tablet Take 1 tablet by mouth daily 30 tablet 5    lisinopril (PRINIVIL;ZESTRIL) 40 MG tablet TAKE 1 TABLET DAILY 90 tablet 2    clopidogrel (PLAVIX) 75 MG tablet Take 1 tablet by mouth daily 30 tablet 5    aspirin 81 MG chewable tablet Take 81 mg by mouth daily       No current facility-administered medications for this visit.      Allergies   Allergen Reactions    Penicillins Rash       Health Maintenance   Topic Date Due    COVID-19 Vaccine (1) Never done    Pneumococcal 0-64 years Vaccine (1 - PCV) Never done    HIV screen  Never done    Hepatitis C screen  Never done    Colorectal Cancer Screen  Never done    Shingles vaccine (1 of 2) Never done    Low dose CT lung screening  Never done    Depression Screen  2022    Flu vaccine (1) Never done    DTaP/Tdap/Td vaccine (2 - Td or Tdap) 2031 (Originally 2030)    Lipids  02/10/2026    Hepatitis A vaccine  Aged Out    Hib vaccine  Aged Out    Meningococcal (ACWY) vaccine  Aged Out       Subjective:     Review of Systems   Constitutional:  Negative for activity change, appetite change, fatigue and fever. /84 (Site: Right Upper Arm, Position: Sitting, Cuff Size: Large Adult)   Pulse 71   Temp 97.1 °F (36.2 °C) (Tympanic)   Ht 6' (1.829 m)   Wt 164 lb (74.4 kg)   SpO2 98%   BMI 22.24 kg/m²    Respiratory:  Negative for cough, shortness of breath and wheezing. Cardiovascular:  Negative for chest pain, palpitations and leg swelling. Gastrointestinal:  Negative for abdominal pain, constipation, diarrhea, nausea and vomiting. Skin:  Positive for wound. Negative for color change, pallor and rash. Neurological:  Negative for dizziness, speech difficulty, weakness, light-headedness and headaches. Hematological:  Negative for adenopathy. Psychiatric/Behavioral:  Negative for sleep disturbance. The patient is not nervous/anxious. Objective:     Physical Exam  Vitals and nursing note reviewed. Constitutional:       General: He is not in acute distress. Appearance: Normal appearance. He is well-developed. He is not ill-appearing. HENT:      Head: Normocephalic and atraumatic. Cardiovascular:      Rate and Rhythm: Normal rate and regular rhythm. Heart sounds: No murmur heard. Pulmonary:      Effort: Pulmonary effort is normal. No respiratory distress. Breath sounds: Normal breath sounds. No wheezing, rhonchi or rales. Musculoskeletal:      Right lower leg: No edema. Left lower leg: No edema. Skin:     General: Skin is warm and dry. Coloration: Skin is not pale. Findings: Lesion (multiple scabbed and healing scratch marks on forehead. no open wounds) present. No erythema or rash. Neurological:      Mental Status: He is alert and oriented to person, place, and time. Psychiatric:         Mood and Affect: Mood normal.         Behavior: Behavior normal.         Thought Content:  Thought content normal.         Judgment: Judgment normal.     /84   Pulse 71   Temp 97.1 °F (36.2 °C) (Tympanic)   Ht 6' (1.829 m)   Wt 164 lb (74.4 kg)   SpO2 98%   BMI 22.24 kg/m²     Assessment:       Diagnosis Orders   1. Essential hypertension  EKG 12 Lead    metoprolol succinate (TOPROL XL) 50 MG extended release tablet    RAFAEL Banks MD, Cardiology, [place]      2. Tobacco abuse  nicotine (NICODERM CQ) 21 MG/24HR      3. Abnormal EKG  RAFAEL Banks MD, Cardiology, [place]      4. Abrasion of face, initial encounter                  Plan:      Return in about 3 months (around 1/13/2023) for hypertension. BP much better here today but still above goal  EKG here reviewed, no apparent acute changes. Last stress test 1 year ago wnl. Recommended cardiology evaluation as well as increase in toprol. Will increase to 50mg daily. Patient agreed to call [facility] for cardio appointment, number given  Ok to RTW Monday, form completed  Cont to keep abrasions clean, they are healing well  Patient interested in starting nicoderm. Use and SE reviewed. NO smoking while using  Patient agreed with treatment plan    Orders Placed This Encounter   Procedures    RAFAEL Banks MD, Cardiology, [place]     Referral Priority:   Routine     Referral Type:   Eval and Treat     Referral Reason:   Specialty Services Required     Referred to Provider:   Jerad Gasca MD     Requested Specialty:   Cardiology     Number of Visits Requested:   1    EKG 12 Lead     Order Specific Question:   Reason for Exam?     Answer:   Chest pain     Orders Placed This Encounter   Medications    nicotine (NICODERM CQ) 21 MG/24HR     Sig: Place 1 patch onto the skin every 24 hours     Dispense:  30 patch     Refill:  3    metoprolol succinate (TOPROL XL) 50 MG extended release tablet     Sig: Take 1 tablet by mouth daily     Dispense:  30 tablet     Refill:  5       Patient given educational materials - see patient instructions. Discussed use, benefit, and side effects of prescribed medications. All patientquestions answered. Pt voiced understanding. Reviewed health maintenance. Instructedto continue current medications, diet and exercise. Patient agreed with treatmentplan. Follow up as directed. Please note that this chart was generated using voice recognition Dragon dictation software. Although every effort was made to ensure the accuracy of this automated transcription, some errors in transcription may have occurred.      Electronically signed by Medardo Davies PA-C on 10/13/2022 at 1:57 PM

## 2022-10-13 NOTE — PROGRESS NOTES
Visit Information    Have you changed or started any medications since your last visit including any over-the-counter medicines, vitamins, or herbal medicines? no   Are you having any side effects from any of your medications? -  no  Have you stopped taking any of your medications? Is so, why? -  no    Have you seen any other physician or provider since your last visit? No  Have you had any other diagnostic tests since your last visit? No  Have you been seen in the emergency room and/or had an admission to a hospital since we last saw you? No  Have you had your routine dental cleaning in the past 6 months? no    Have you activated your [employer] account? If not, what are your barriers?  No:      Patient Care Team:  Yahaira Whelan PA-C as PCP - General (Family Medicine)  Yahaira Whelan PA-C as PCP - [facility]    Medical History Review  Past Medical, Family, and Social History reviewed and  contribute to the patient presenting condition    Health Maintenance   Topic Date Due    COVID-19 Vaccine (1) Never done    Pneumococcal 0-64 years Vaccine (1 - PCV) Never done    HIV screen  Never done    Hepatitis C screen  Never done    Colorectal Cancer Screen  Never done    Shingles vaccine (1 of 2) Never done    Low dose CT lung screening  Never done    Depression Screen  01/12/2022    Flu vaccine (1) Never done    DTaP/Tdap/Td vaccine (2 - Td or Tdap) 09/01/2031 (Originally 9/1/2030)    Lipids  02/10/2026    Hepatitis A vaccine  Aged Out    Hib vaccine  Aged Out    Meningococcal (ACWY) vaccine  Aged Out

## 2023-02-10 ENCOUNTER — COMMUNITY OUTREACH (OUTPATIENT)
Dept: FAMILY MEDICINE CLINIC | Age: 63
End: 2023-02-10

## 2023-03-21 ENCOUNTER — OFFICE VISIT (OUTPATIENT)
Dept: FAMILY MEDICINE CLINIC | Age: 63
End: 2023-03-21
Payer: COMMERCIAL

## 2023-03-21 VITALS
HEART RATE: 74 BPM | WEIGHT: 167 LBS | BODY MASS INDEX: 22.62 KG/M2 | HEIGHT: 72 IN | TEMPERATURE: 97 F | SYSTOLIC BLOOD PRESSURE: 138 MMHG | OXYGEN SATURATION: 97 % | DIASTOLIC BLOOD PRESSURE: 86 MMHG

## 2023-03-21 DIAGNOSIS — Z12.5 PROSTATE CANCER SCREENING: ICD-10-CM

## 2023-03-21 DIAGNOSIS — Z72.0 TOBACCO ABUSE: ICD-10-CM

## 2023-03-21 DIAGNOSIS — I10 ESSENTIAL HYPERTENSION: ICD-10-CM

## 2023-03-21 DIAGNOSIS — I73.9 PAD (PERIPHERAL ARTERY DISEASE) (HCC): Primary | ICD-10-CM

## 2023-03-21 DIAGNOSIS — Z13.220 LIPID SCREENING: ICD-10-CM

## 2023-03-21 PROCEDURE — 3079F DIAST BP 80-89 MM HG: CPT | Performed by: NURSE PRACTITIONER

## 2023-03-21 PROCEDURE — 3075F SYST BP GE 130 - 139MM HG: CPT | Performed by: NURSE PRACTITIONER

## 2023-03-21 PROCEDURE — 99213 OFFICE O/P EST LOW 20 MIN: CPT | Performed by: NURSE PRACTITIONER

## 2023-03-21 RX ORDER — CLOPIDOGREL BISULFATE 75 MG/1
75 TABLET ORAL DAILY
Qty: 30 TABLET | Refills: 5 | Status: SHIPPED | OUTPATIENT
Start: 2023-03-21

## 2023-03-21 SDOH — ECONOMIC STABILITY: FOOD INSECURITY: WITHIN THE PAST 12 MONTHS, THE FOOD YOU BOUGHT JUST DIDN'T LAST AND YOU DIDN'T HAVE MONEY TO GET MORE.: NEVER TRUE

## 2023-03-21 SDOH — ECONOMIC STABILITY: FOOD INSECURITY: WITHIN THE PAST 12 MONTHS, YOU WORRIED THAT YOUR FOOD WOULD RUN OUT BEFORE YOU GOT MONEY TO BUY MORE.: NEVER TRUE

## 2023-03-21 SDOH — ECONOMIC STABILITY: INCOME INSECURITY: HOW HARD IS IT FOR YOU TO PAY FOR THE VERY BASICS LIKE FOOD, HOUSING, MEDICAL CARE, AND HEATING?: NOT HARD AT ALL

## 2023-03-21 SDOH — ECONOMIC STABILITY: HOUSING INSECURITY
IN THE LAST 12 MONTHS, WAS THERE A TIME WHEN YOU DID NOT HAVE A STEADY PLACE TO SLEEP OR SLEPT IN A SHELTER (INCLUDING NOW)?: NO

## 2023-03-21 ASSESSMENT — ENCOUNTER SYMPTOMS
DIARRHEA: 0
VOMITING: 0
SHORTNESS OF BREATH: 0
NAUSEA: 0
EYE PAIN: 0
SORE THROAT: 0
SINUS PAIN: 0
BACK PAIN: 0
COUGH: 0
ABDOMINAL PAIN: 0

## 2023-03-21 ASSESSMENT — PATIENT HEALTH QUESTIONNAIRE - PHQ9
SUM OF ALL RESPONSES TO PHQ9 QUESTIONS 1 & 2: 0
SUM OF ALL RESPONSES TO PHQ QUESTIONS 1-9: 0
1. LITTLE INTEREST OR PLEASURE IN DOING THINGS: 0
2. FEELING DOWN, DEPRESSED OR HOPELESS: 0

## 2023-03-21 NOTE — PATIENT INSTRUCTIONS
by stopping all at once (\"cold turkey\"). You want to cut back slowly on the number of cigarettes you smoke. You do not like using medicine. You feel the side effects of medicines outweigh the benefits. You are worried about the cost of medicines. Your decision  Thinking about the facts and your feelings can help you make a decision that is right for you. Be sure you understand the benefits and risks of your options, and think about what else you need to do before you make the decision. Where can you learn more? Go to http://www.woods.com/ and enter A228 to learn more about \"Deciding About Using Medicines To Quit Smoking. \"  Current as of: August 2, 2022               Content Version: 13.6  © 2006-2023 Healthwise, Incorporated. Care instructions adapted under license by [facility] ([facility]). If you have questions about a medical condition or this instruction, always ask your healthcare professional. [address] any warranty or liability for your use of this information.

## 2023-03-21 NOTE — PROGRESS NOTES
Visit Information    Have you changed or started any medications since your last visit including any over-the-counter medicines, vitamins, or herbal medicines? no   Are you having any side effects from any of your medications? -  no  Have you stopped taking any of your medications? Is so, why? -  no    Have you seen any other physician or provider since your last visit? No  Have you had any other diagnostic tests since your last visit? No  Have you been seen in the emergency room and/or had an admission to a hospital since we last saw you? No  Have you had your routine dental cleaning in the past 6 months? no    Have you activated your [employer] account?  If not, what are your barriers? no    Patient Care Team:  Ayesha Currie PA-C as PCP - General (Family Medicine)  Ayesha Currie PA-C as PCP - Empaneled Provider    Medical History Review  Past Medical, Family, and Social History reviewed and  contribute to the patient presenting condition    Health Maintenance   Topic Date Due    COVID-19 Vaccine (1) Never done    Pneumococcal 0-64 years Vaccine (1 - PCV) Never done    HIV screen  Never done    Hepatitis C screen  Never done    Colorectal Cancer Screen  Never done    Shingles vaccine (1 of 2) Never done    Low dose CT lung screening  Never done    Flu vaccine (1) Never done    DTaP/Tdap/Td vaccine (2 - Td or Tdap) 09/01/2031 (Originally 9/1/2030)    Depression Screen  10/13/2023    Lipids  02/10/2026    Hepatitis A vaccine  Aged Out    Hib vaccine  Aged Out    Meningococcal (ACWY) vaccine  Aged Out

## 2023-03-21 NOTE — PROGRESS NOTES
[address]  [address]  [facility] [facility] [address]  Dept: [phone]  Dept Fax: [phone]    Genna Hairston is a 59 y.o. male who presents today for his medicalconditions/complaints as noted below. Genna Hairston is c/o of Peripheral Neuropathy (Bilateral feet tingling /He has stents bilateral legs-  Марина referred him to see vascular a couple years ago - but than he got sent out of work and could not get there - discuss Plavix /)      HPI:       43-year-old male patient presents with a significant history of peripheral arterial disease. Patient has a long history of smoking. Patient has previously followed with vascular surgery and required stents to his legs bilaterally. Patient reports that he is getting tingling in his feet and ankles. Reports that his legs tired and fatigued easily. Patient is currently on dual antiplatelet therapy aspirin and Plavix. Past Medical History:   Diagnosis Date    Claudication [facility])     Essential hypertension 01/22/2016    PAD (peripheral artery disease) ([facility])     PVD (peripheral vascular disease) ([facility])         Current Outpatient Medications   Medication Sig Dispense Refill    clopidogrel (PLAVIX) 75 MG tablet Take 1 tablet by mouth daily 30 tablet 5    nicotine (NICODERM CQ) 21 MG/24HR Place 1 patch onto the skin every 24 hours 30 patch 3    metoprolol succinate (TOPROL XL) 50 MG extended release tablet Take 1 tablet by mouth daily 30 tablet 5    lisinopril (PRINIVIL;ZESTRIL) 40 MG tablet TAKE 1 TABLET DAILY 90 tablet 2    aspirin 81 MG chewable tablet Take 81 mg by mouth daily       No current facility-administered medications for this visit. Allergies   Allergen Reactions    Penicillins Rash       Subjective:      Review of Systems   Constitutional:  Negative for chills and fatigue. HENT:  Negative for congestion, ear pain, sinus pain and sore throat.     Eyes:  Negative for pain

## 2023-04-04 ENCOUNTER — TELEPHONE (OUTPATIENT)
Dept: VASCULAR SURGERY | Age: 63
End: 2023-04-04

## 2023-05-04 DIAGNOSIS — I73.9 PERIPHERAL VASCULAR DISEASE, UNSPECIFIED (HCC): Primary | ICD-10-CM

## 2023-05-15 ENCOUNTER — HOSPITAL ENCOUNTER (OUTPATIENT)
Dept: VASCULAR LAB | Age: 63
Discharge: HOME | End: 2023-05-15
Payer: COMMERCIAL

## 2023-05-15 DIAGNOSIS — I73.9 PERIPHERAL VASCULAR DISEASE, UNSPECIFIED (HCC): ICD-10-CM

## 2023-05-15 PROCEDURE — 93923 UPR/LXTR ART STDY 3+ LVLS: CPT

## 2023-05-24 PROBLEM — I70.213 ATHEROSCLEROSIS OF NATIVE ARTERIES OF EXTREMITIES WITH INTERMITTENT CLAUDICATION, BILATERAL LEGS (HCC): Status: ACTIVE | Noted: 2023-05-24

## 2023-07-20 ENCOUNTER — TELEPHONE (OUTPATIENT)
Dept: VASCULAR SURGERY | Age: 63
End: 2023-07-20

## 2023-07-20 NOTE — TELEPHONE ENCOUNTER
Received a referral again for patient to be seen by Dr. Keith Wilson for PAD. Patient was a no show on 05/24/2023 and 06/07/2023. I attempted to contact the patient to reschedule again and his voice mail box is not set up to receive messages.

## 2024-10-15 ENCOUNTER — HOSPITAL ENCOUNTER (OUTPATIENT)
Age: 64
Setting detail: SPECIMEN
Discharge: HOME | End: 2024-10-15

## 2024-10-15 ENCOUNTER — OFFICE VISIT (OUTPATIENT)
Dept: FAMILY MEDICINE CLINIC | Age: 64
End: 2024-10-15
Payer: COMMERCIAL

## 2024-10-15 VITALS
HEART RATE: 80 BPM | SYSTOLIC BLOOD PRESSURE: 136 MMHG | WEIGHT: 151 LBS | TEMPERATURE: 97 F | BODY MASS INDEX: 20.45 KG/M2 | OXYGEN SATURATION: 95 % | HEIGHT: 72 IN | DIASTOLIC BLOOD PRESSURE: 88 MMHG

## 2024-10-15 DIAGNOSIS — I10 ESSENTIAL HYPERTENSION: ICD-10-CM

## 2024-10-15 DIAGNOSIS — Z72.0 TOBACCO ABUSE: ICD-10-CM

## 2024-10-15 DIAGNOSIS — Z13.220 LIPID SCREENING: ICD-10-CM

## 2024-10-15 DIAGNOSIS — Z12.5 PROSTATE CANCER SCREENING: ICD-10-CM

## 2024-10-15 DIAGNOSIS — I73.9 PAD (PERIPHERAL ARTERY DISEASE) (HCC): ICD-10-CM

## 2024-10-15 DIAGNOSIS — Z13.1 SCREENING FOR DIABETES MELLITUS: ICD-10-CM

## 2024-10-15 DIAGNOSIS — I10 ESSENTIAL HYPERTENSION: Primary | ICD-10-CM

## 2024-10-15 LAB
ALBUMIN SERPL-MCNC: 3.9 G/DL (ref 3.5–5.2)
ALBUMIN/GLOB SERPL: 1 {RATIO} (ref 1–2.5)
ALP SERPL-CCNC: 73 U/L (ref 40–129)
ALT SERPL-CCNC: 7 U/L (ref 10–50)
ANION GAP SERPL CALCULATED.3IONS-SCNC: 8 MMOL/L (ref 9–16)
AST SERPL-CCNC: 16 U/L (ref 10–50)
BASOPHILS # BLD: 0.11 K/UL (ref 0–0.2)
BASOPHILS NFR BLD: 1 % (ref 0–2)
BILIRUB SERPL-MCNC: 0.2 MG/DL (ref 0–1.2)
BUN SERPL-MCNC: 11 MG/DL (ref 8–23)
CALCIUM SERPL-MCNC: 9.4 MG/DL (ref 8.6–10.4)
CHLORIDE SERPL-SCNC: 102 MMOL/L (ref 98–107)
CHOLEST SERPL-MCNC: 193 MG/DL (ref 0–199)
CHOLESTEROL/HDL RATIO: 4
CO2 SERPL-SCNC: 27 MMOL/L (ref 20–31)
CREAT SERPL-MCNC: 0.7 MG/DL (ref 0.7–1.2)
EOSINOPHIL # BLD: 0 K/UL (ref 0–0.4)
EOSINOPHILS RELATIVE PERCENT: 0 % (ref 1–4)
ERYTHROCYTE [DISTWIDTH] IN BLOOD BY AUTOMATED COUNT: 15.7 % (ref 11.8–14.4)
EST. AVERAGE GLUCOSE BLD GHB EST-MCNC: 114 MG/DL
GFR, ESTIMATED: >90 ML/MIN/1.73M2
GLUCOSE SERPL-MCNC: 90 MG/DL (ref 74–99)
HBA1C MFR BLD: 5.6 % (ref 4–6)
HCT VFR BLD AUTO: 44.9 % (ref 40.7–50.3)
HDLC SERPL-MCNC: 48 MG/DL
HGB BLD-MCNC: 14.7 G/DL (ref 13–17)
IMM GRANULOCYTES # BLD AUTO: 0 K/UL (ref 0–0.3)
IMM GRANULOCYTES NFR BLD: 0 %
LDLC SERPL CALC-MCNC: 116 MG/DL (ref 0–100)
LYMPHOCYTES NFR BLD: 5.51 K/UL (ref 1–4.8)
LYMPHOCYTES RELATIVE PERCENT: 51 % (ref 24–44)
MCH RBC QN AUTO: 29.3 PG (ref 25.2–33.5)
MCHC RBC AUTO-ENTMCNC: 32.7 G/DL (ref 28.4–34.8)
MCV RBC AUTO: 89.6 FL (ref 82.6–102.9)
MONOCYTES NFR BLD: 0.97 K/UL (ref 0.1–0.8)
MONOCYTES NFR BLD: 9 % (ref 1–7)
MORPHOLOGY: ABNORMAL
NEUTROPHILS NFR BLD: 39 % (ref 36–66)
NEUTS SEG NFR BLD: 4.21 K/UL (ref 1.8–7.7)
NRBC BLD-RTO: 0 PER 100 WBC
PLATELET # BLD AUTO: 396 K/UL (ref 138–453)
PMV BLD AUTO: 11.2 FL (ref 8.1–13.5)
POTASSIUM SERPL-SCNC: 4.6 MMOL/L (ref 3.7–5.3)
PROT SERPL-MCNC: 6.6 G/DL (ref 6.6–8.7)
PSA SERPL-MCNC: 2.1 NG/ML (ref 0–4)
RBC # BLD AUTO: 5.01 M/UL (ref 4.21–5.77)
SODIUM SERPL-SCNC: 137 MMOL/L (ref 136–145)
TRIGL SERPL-MCNC: 143 MG/DL
VLDLC SERPL CALC-MCNC: 29 MG/DL
WBC OTHER # BLD: 10.8 K/UL (ref 3.5–11.3)

## 2024-10-15 PROCEDURE — 99213 OFFICE O/P EST LOW 20 MIN: CPT | Performed by: PHYSICIAN ASSISTANT

## 2024-10-15 PROCEDURE — 3079F DIAST BP 80-89 MM HG: CPT | Performed by: PHYSICIAN ASSISTANT

## 2024-10-15 PROCEDURE — 3075F SYST BP GE 130 - 139MM HG: CPT | Performed by: PHYSICIAN ASSISTANT

## 2024-10-15 RX ORDER — METOPROLOL SUCCINATE 50 MG/1
50 TABLET, EXTENDED RELEASE ORAL DAILY
Qty: 90 TABLET | Refills: 1 | Status: SHIPPED | OUTPATIENT
Start: 2024-10-15

## 2024-10-15 RX ORDER — LISINOPRIL 40 MG/1
40 TABLET ORAL DAILY
Qty: 90 TABLET | Refills: 1 | Status: SHIPPED | OUTPATIENT
Start: 2024-10-15

## 2024-10-15 RX ORDER — CLOPIDOGREL BISULFATE 75 MG/1
75 TABLET ORAL DAILY
Qty: 90 TABLET | Refills: 1 | Status: SHIPPED | OUTPATIENT
Start: 2024-10-15

## 2024-10-15 SDOH — ECONOMIC STABILITY: FOOD INSECURITY: WITHIN THE PAST 12 MONTHS, YOU WORRIED THAT YOUR FOOD WOULD RUN OUT BEFORE YOU GOT MONEY TO BUY MORE.: PATIENT DECLINED

## 2024-10-15 SDOH — ECONOMIC STABILITY: FOOD INSECURITY: WITHIN THE PAST 12 MONTHS, THE FOOD YOU BOUGHT JUST DIDN'T LAST AND YOU DIDN'T HAVE MONEY TO GET MORE.: PATIENT DECLINED

## 2024-10-15 SDOH — ECONOMIC STABILITY: INCOME INSECURITY: HOW HARD IS IT FOR YOU TO PAY FOR THE VERY BASICS LIKE FOOD, HOUSING, MEDICAL CARE, AND HEATING?: PATIENT DECLINED

## 2024-10-15 ASSESSMENT — ENCOUNTER SYMPTOMS
COUGH: 0
SHORTNESS OF BREATH: 0
COLOR CHANGE: 0
WHEEZING: 0

## 2024-10-15 ASSESSMENT — PATIENT HEALTH QUESTIONNAIRE - PHQ9
1. LITTLE INTEREST OR PLEASURE IN DOING THINGS: NOT AT ALL
SUM OF ALL RESPONSES TO PHQ QUESTIONS 1-9: 0
SUM OF ALL RESPONSES TO PHQ QUESTIONS 1-9: 0
SUM OF ALL RESPONSES TO PHQ9 QUESTIONS 1 & 2: 0
2. FEELING DOWN, DEPRESSED OR HOPELESS: NOT AT ALL
SUM OF ALL RESPONSES TO PHQ QUESTIONS 1-9: 0
SUM OF ALL RESPONSES TO PHQ QUESTIONS 1-9: 0

## 2024-10-15 NOTE — PROGRESS NOTES
Visit Information    Have you changed or started any medications since your last visit including any over-the-counter medicines, vitamins, or herbal medicines? no   Are you having any side effects from any of your medications? -  no  Have you stopped taking any of your medications? Is so, why? -  no    Have you seen any other physician or provider since your last visit? No  Have you had any other diagnostic tests since your last visit? No  Have you been seen in the emergency room and/or had an admission to a hospital since we last saw you? No  Have you had your routine dental cleaning in the past 6 months? no    Have you activated your [employer] account? If not, what are your barriers? Yes     Patient Care Team:  Марина Love PA-C as PCP - General (Family Medicine)  Марина Love PA-C as PCP - Empaneled Provider    Medical History Review  Past Medical, Family, and Social History reviewed and  contribute to the patient presenting condition    Health Maintenance   Topic Date Due    Pneumococcal 0-64 years Vaccine (1 of 2 - PCV) Never done    HIV screen  Never done    Hepatitis C screen  Never done    Colorectal Cancer Screen  Never done    Shingles vaccine (1 of 2) Never done    Respiratory Syncytial Virus (RSV) Pregnant or age 60 yrs+ (1 - 1-dose 60+ series) Never done    Lung Cancer Screening &/or Counseling  01/12/2022    Depression Screen  03/21/2024    Flu vaccine (1) Never done    COVID-19 Vaccine (1 - 2023-24 season) Never done    DTaP/Tdap/Td vaccine (2 - Td or Tdap) 09/01/2031 (Originally 9/1/2030)    Lipids  02/10/2026    Hepatitis A vaccine  Aged Out    Hepatitis B vaccine  Aged Out    Hib vaccine  Aged Out    Polio vaccine  Aged Out    Meningococcal (ACWY) vaccine  Aged Out    Prostate Specific Antigen (PSA) Screening or Monitoring  Discontinued

## 2024-10-15 NOTE — PROGRESS NOTES
[facility]  [facility]  [address] [facility] [address]  Dept: [phone]  Dept Fax: [phone]    Candido Covarrubias is a 64 y.o. male who presents today for his medical conditions/complaintsas noted below.  Candido Covarrubias is c/o of   Chief Complaint   Patient presents with    Hypertension       HPI:     Hypertension  Pertinent negatives include no chest pain, palpitations or shortness of breath.   Patient is here today for follow-up on hypertension treatment.  Patient is presently on lisinopril 40 mg daily, metoprolol XL 50 mg but states he has not been compliant with dosing often.  He was on Plavix for PAD. Patient states he is presently out of this medication. Has seen vascular in the past and did require stents to the legs bilaterally. Patient reports feeling well and no present concern.  He updates he is working on getting his teeth removed. He is planning to get both upper and lower dentures. Patient reports his BP was elevated and was told to reschedule the procedure.   Patient also updates he continues to smoke but is down from 1 ppd to 1 pack per week.  Patient states he also cut back on alcohol use. Only occasionally has a beer now.    No results found for: \"LABA1C\"          ( goal A1Cis < 7)   No components found for: \"LABMICR\"  No components found for: \"LDLCHOLESTEROL\", \"LDLCALC\"    (goal LDL is <100)   AST (U/L)   Date Value   02/10/2021 27     ALT (U/L)   Date Value   02/10/2021 22     BUN (mg/dL)   Date Value   02/10/2021 9     BP Readings from Last 3 Encounters:   10/15/24 136/88   03/21/23 138/86   10/13/22 138/84          (goal 120/80)    Past Medical History:   Diagnosis Date    Claudication ([facility]CC)     Essential hypertension 01/22/2016    PAD (peripheral artery disease) ([facility])     PVD (peripheral vascular disease) ([facility])       Past Surgical History:   Procedure Laterality Date    COLONOSCOPY      OTHER SURGICAL HISTORY  02/18/2021    RLE angiogram  /

## 2024-10-16 DIAGNOSIS — D72.820 LYMPHOCYTOSIS: Primary | ICD-10-CM

## 2024-10-16 LAB
IMM RETICS NFR: 8.5 % (ref 2.7–18.3)
RETIC HEMOGLOBIN: 32.7 PG (ref 28.2–35.7)
RETICS # AUTO: 0.07 M/UL (ref 0.03–0.08)
RETICS/RBC NFR AUTO: 1.5 % (ref 0.5–1.9)

## 2024-10-17 ENCOUNTER — TELEPHONE (OUTPATIENT)
Dept: FAMILY MEDICINE CLINIC | Age: 64
End: 2024-10-17

## 2024-10-17 LAB — SURGICAL PATHOLOGY REPORT: NORMAL

## 2024-10-17 NOTE — TELEPHONE ENCOUNTER
Tried to call patient to return to lab for additional lab draw because his RBC was elevated- unable to leave a message - no voicemail set up   Lab was able to add on path smear but not the cytometry

## 2024-10-18 LAB — PATH REV BLD -IMP: NORMAL
